# Patient Record
(demographics unavailable — no encounter records)

---

## 2024-10-08 NOTE — REASON FOR VISIT
[Follow-Up: _____] : a [unfilled] follow-up visit [FreeTextEntry1] : The patient is a postmenopausal female with a family history of breast cancer who was seen by Dr. Fernandez back in 2021 and underwent a right breast 12:00 stereotactic core biopsy for calcifications which showed fibroadenomatoid change and benign calcifications.  She was found to have increasing calcifications in the right breast upper outer quadrant as well as a slight mammographic density on mammography from September 2023 and stereotactic core biopsies were performed in October 2023 showed high-grade DCIS which was ER/ND positive.  MRI showed enhancement over region of 4 x 3.5 cm but the clips were felt to be close together towards the upper outer aspect of the right breast and she underwent a right breast partial mastectomy with bracketed localization with sentinel lymph node biopsy and tissue rearrangement reduction mastopexy by plastic surgery on January 8, 2024.  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  There was surrounding DCIS but formal margins were all free and she had no lymphovascular invasion.  She was found to have a 1 cm focus of DCIS in some of the extra breast tissue removed during the mastopexy on the right side but this did not extend any margins.  This was a pathologic prognostic stage IA breast cancer.  The left reduction mastopexy tissue just showed an atypical intraductal papilloma which was benign.  She was presented at tumor board and it was felt that we could just proceed with radiation without further surgery and she underwent external beam radiation with Dr. Collado which finished in March 2024.  She was seen by Dr. Staples and Oncotype recurrence score was 18 and anastrozole was recommended.  She comes in now for routine follow-up. [FreeTextEntry2] : January 8, 2024

## 2024-10-08 NOTE — ADDENDUM
[FreeTextEntry1] : I spent greater than 75% the consultation in face-to-face counseling and coordination of care in this patient with a history of a right breast cancer who underwent a partial mastectomy and comes in now for routine breast cancer screening/surveillance.

## 2024-10-08 NOTE — ASSESSMENT
[FreeTextEntry1] : The patient is a 59-year-old G3, P1 postmenopausal woman.  She underwent menarche at age 11.  She has a family history with her paternal aunt with breast cancer at age 50.  She was found to have some suspicious calcifications seen on mammography from [facility] radiology in [facility] on October 13, 2021 and was seen by Dr. Fernandez at that time and underwent right breast 12:00 stereotactic core biopsy on November 17, 2021 just showing fibroadenomatoid change and benign calcifications.  Follow-up mammographies in 2022 showed likely vascular or milk of calcium calcifications.  Another bilateral mammography performed on September 11, 2023 showed increasing calcifications again in the upper outer aspect of the right breast as well as a partially circumscribed mass in the upper outer aspect of the right breast and stereotactic core biopsies were performed.  The upper outer quadrant mass with calcifications showed intermediate to high-grade DCIS which was ER/MS positive (ER-95%, MS-50%) and she had an "hourglass" clip placed at that biopsy site and the upper outer quadrant calcifications showed intermediate grade DCIS again ER/MS positive (ER-95%, MS-90%) with a "stoplight" clip placed at that biopsy site.  She underwent [facility] genetic panel testing in November 2023 which was negative. MRI was performed on December 7, 2023 showing the area of the 2 clips with this localized upper outer quadrant region of non-mass like enhancement over an area of 4 x 3.5 x 3.5 cm. I did review her postbiopsy mammographic imaging as well as her recent MRI and these 2 clips were fairly close together and she was felt to be a good candidate for partial mastectomy with bracketed localizations and she was seen by Dr. Lerman preoperatively and felt to be a good candidate for reduction mastopexy at the time of surgery.  [place] lymph node biopsy was also recommended given the extent of the resection being performed.  She underwent a right breast partial mastectomy with bracketed localizations and sentinel lymph node biopsy with tissue rearrangement reduction mastopexy performed by Dr. Lerman on January 8, 2024.  The final pathology showed 2 negative right axillary sentinel lymph nodes and 1 negative right axillary nonsentinel lymph node and the wide excision did show a 1.3 cm high-grade micropapillary type invasive duct cancer but no lymphovascular invasion.  The invasive cancer extended to the superior medial margin however all final margins were negative.  She did have extensive DCIS measuring 3.5 cm which was high-grade with close margins on the initial partial mastectomy but again all final margins were negative.  The invasive cancer was ER positive greater than 95%, MS positive at 70%, HER2 2+ equivocal but negative by FISH with a Ki-67 of 30%.  The excess right breast tissue removed by plastic surgery did have 1 focal area with DCIS measuring 1 cm but margins were all negative.  This cancer was a pathologic prognostic stage IA T1c N0 M0 breast cancer. It was impossible to tell exactly where that separate area of DCIS originated from.  She did have an atypical intraductal papilloma seen in the excess breast tissue on the left.   The patient has been unhappy about her breast size which she thinks is too small but a significant amount of tissue had to be removed from her right breast upper outer quadrant in order to clear her margins. I did place fiducials at the site of the right breast upper outer quadrant partial mastectomy at the time of surgery for the radiation oncologist.  I did present her case in tumor board and there was agreement that we could just move forward with external beam radiation to the right breast and mastectomy was not required given the fact that this other incidental area of DCIS was focal and not extending to any of the margin of the excess tissue excised.  The invasive tumor had an Oncotype recurrence score of 18 and she was seen by Dr. Staples and was placed on anastrozole after radiation.  She was seen by Dr. Collado for radiation oncology evaluation and underwent hypofractionated radiation to the right breast which finished in March 2024.  The patient understands the benefits for endocrine therapy and was placed on anastrozole by Dr. Staples.  She underwent her last bilateral mammography and ultrasound which was reviewed from September 26, 2024 performed at [place] [facility] which showed bilateral postsurgical changes with a seroma measuring 2 cm seen in the left breast. On exam, the patient has had some fat necrosis felt on the lateral aspect of the left chest wall which was related to her reduction mastopexy and I did aspirate 6 cc of clear yellow fluid with near resolution of the mass today.  She has done well from her right breast radiation and does have some radiation changes to the skin but no evidence of recurrence.  Her free nipple grafts have healed well.  She was reassured and I would like to see her again in another 6 months for routine follow-up.  She should follow-up with Dr. Lerman from a cosmetic standpoint.  Her next routine bilateral mammography and ultrasound will be due in September 2025 and she was given prescriptions.  I would like to continue with yearly MRIs in this patient due to the fact that she had this other incidental finding of DCIS in some of the excess tissue removed by plastic surgery and I would like to stagger the MRIs at 6-month intervals with the mammo and ultrasound.  Her next MRI should be performed around March 2025 and she was given a prescription.  She should continue to follow-up with Dr. Staples and remain on anastrozole.  She is along a happy about the dogears on the sides of her chest wall and does not want go back to Dr. Lerman and I gave her Dr. Quijano and Dr. Goldberg's contact information.  She was also given Arthur Sheridan's name for nipple tattooing since she has had some depigmentation of the left nipple.

## 2024-10-08 NOTE — HISTORY OF PRESENT ILLNESS
[FreeTextEntry1] : The patient is a 59-year-old G3, P1 postmenopausal woman.  She underwent menarche at age 11.  She has a family history with her paternal aunt with breast cancer at age 50.  She was found to have some suspicious calcifications seen on mammography from [facility] radiology in [facility] on October 13, 2021 and was seen by Dr. Fernandez at that time and underwent right breast 12:00 stereotactic core biopsy on November 17, 2021 just showing fibroadenomatoid change and benign calcifications.  Follow-up mammographies in 2022 showed likely vascular or milk of calcium calcifications.  A more recent bilateral mammography performed on September 11, 2023 showed increasing calcifications again in the upper outer aspect of the right breast as well as a partially circumscribed mass in the upper outer aspect of the right breast and stereotactic core biopsies were performed.  The upper outer quadrant mass with calcifications showed intermediate to high-grade DCIS which was ER/CT positive and she had an "hourglass" clip placed at that biopsy site and the upper outer quadrant calcifications showed intermediate grade DCIS again ER/CT positive with a "stoplight" clip placed at that biopsy site.  She underwent [facility] genetic panel testing in November 2023 which was negative.  MRI was performed on December 7, 2023 showing the area of the 2 clips with this localized upper outer quadrant region of non-mass like enhancement over an area of 4 x 3.5 x 3.5 cm.  I did review her postbiopsy mammographic imaging as well as her recent MRI and these 2 clips were fairly close together and she was felt to be a good candidate for partial mastectomy with bracketed localizations and she was seen by Dr. Lerman preoperatively and felt to be a good candidate for reduction mastopexy at the time of surgery.  [place] lymph node biopsy was also recommended given the extent of the resection being performed.  She underwent a right breast partial mastectomy with bracketed localizations and sentinel lymph node biopsy with tissue rearrangement reduction mastopexy performed by Dr. Lerman on January 8, 2024.  She did have bilateral free nipple skin grafts performed.  The final pathology showed 2 negative right axillary sentinel lymph nodes and 1 negative right axillary nonsentinel lymph node and the wide excision did show a 1.3 cm high-grade micropapillary type invasive duct cancer but no lymphovascular invasion.  The invasive cancer extended to the superior medial margin however all final margins were negative.  She did have extensive DCIS measuring 3.5 cm which was high-grade with close margins on the initial partial mastectomy but again all final margins were negative.  The invasive cancer was ER positive greater than 95%, CT positive at 70%, HER2 2+ equivocal but negative by FISH with a Ki-67 of 30%.  The excess right breast tissue removed by plastic surgery did have 1 focal area with DCIS measuring 1 cm but margins were all negative.  This cancer was a pathologic prognostic stage IA T1 cN0 M0 breast cancer. It was impossible to tell exactly where that area of DCIS originated from.  She did have an atypical intraductal papilloma seen in the excess breast tissue on the left.  The tumor was sent for an Oncotype recurrence score which was 18 and she was seen by Dr. Staples who recommended anastrozole and no chemotherapy.  She was seen by Dr. Collado and underwent external beam radiation which finished in March 2024.  She comes in now for an interval follow up ????? lateral chest wall mass again ?????.

## 2024-10-08 NOTE — PHYSICAL EXAM
[No Supraclavicular Adenopathy] : no supraclavicular adenopathy [No Cervical Adenopathy] : no cervical adenopathy [Clear to Auscultation Bilat] : clear to auscultation bilaterally [Examined in the supine and seated position] : examined in the supine and seated position [Symmetrical] : symmetrical [No dominant masses] : no dominant masses in right breast  [No dominant masses] : no dominant masses left breast [No Nipple Retraction] : no left nipple retraction [No Nipple Discharge] : no left nipple discharge [Breast Mass Right Breast ___cm] : no masses [Breast Mass Left Breast ___cm] : no masses [No Axillary Lymphadenopathy] : no left axillary lymphadenopathy [No Edema] : no edema [No Rashes] : no rashes [Breast Nipple Inversion] : nipples not inverted [Breast Nipple Retraction] : nipples not retracted [Breast Nipple Flattening] : nipples not flattened [Breast Nipple Fissures] : nipples not fissured [Breast Abnormal Lactation (Galactorrhea)] : no galactorrhea [Breast Abnormal Secretion Bloody Fluid] : no bloody discharge [Breast Abnormal Secretion Serous Fluid] : no serous discharge [Breast Abnormal Secretion Opalescent Fluid] : no milky discharge [de-identified] : On exam, the patient has done well from her right breast partial mastectomy with bracketed localizations and sentinel lymph node biopsy with tissue rearrangement reduction mastopexy by plastics performed in January 2024.  She had free nipple skin grafts performed and then underwent external beam radiation which finished in March 2024.  She has no evidence of recurrence in the right breast and no suspicious findings in the left breast.  She has no axillary, supraclavicular, or cervical adenopathy. [de-identified] : Status post partial mastectomy with bracketed localization and sentinel lymph node biopsy with tissue rearrangement reduction mastopexy by plastics with free nipple skin graft and then radiation with no evidence of recurrence.   [de-identified] : Status post reduction mastopexy for symmetry with free nipple skin graft with a density felt on the far lateral aspect of the left breast consistent with fat necrosis and I performed a directed ultrasound and aspirated about ??????? 5 cc of clear yellow fluid with near resolution of the mass. [de-identified] : Free nipple skin grafts with some mild epidermolysis.

## 2024-10-08 NOTE — REASON FOR VISIT
[Follow-Up: _____] : a [unfilled] follow-up visit [FreeTextEntry1] : The patient is a postmenopausal female with a family history of breast cancer who was seen by Dr. Fernandez back in 2021 and underwent a right breast 12:00 stereotactic core biopsy for calcifications which showed fibroadenomatoid change and benign calcifications.  She was found to have increasing calcifications in the right breast upper outer quadrant as well as a slight mammographic density on mammography from September 2023 and stereotactic core biopsies were performed in October 2023 showed high-grade DCIS which was ER/CT positive.  MRI showed enhancement over region of 4 x 3.5 cm but the clips were felt to be close together towards the upper outer aspect of the right breast and she underwent a right breast partial mastectomy with bracketed localization with sentinel lymph node biopsy and tissue rearrangement reduction mastopexy by plastic surgery on January 8, 2024.  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  There was surrounding DCIS but formal margins were all free and she had no lymphovascular invasion.  She was found to have a 1 cm focus of DCIS in some of the extra breast tissue removed during the mastopexy on the right side but this did not extend any margins.  This was a pathologic prognostic stage IA breast cancer.  The left reduction mastopexy tissue just showed an atypical intraductal papilloma which was benign.  She was presented at tumor board and it was felt that we could just proceed with radiation without further surgery and she underwent external beam radiation with Dr. Collado which finished in March 2024.  She was seen by Dr. Staples and Oncotype recurrence score was 18 and anastrozole was recommended.  She comes in now for routine follow-up. [FreeTextEntry2] : January 8, 2024

## 2024-10-08 NOTE — PAST MEDICAL HISTORY
[Menarche Age ____] : age at menarche was [unfilled] [Approximately ___] : the LMP was approximately [unfilled] [Total Preg ___] : G[unfilled] [Live Births ___] : P[unfilled]  [Premature ___] : Premature: [unfilled] [History of Hormone Replacement Treatment] : has no history of hormone replacement treatment

## 2024-10-08 NOTE — ASSESSMENT
[FreeTextEntry1] : The patient is a 59-year-old G3, P1 postmenopausal woman.  She underwent menarche at age 11.  She has a family history with her paternal aunt with breast cancer at age 50.  She was found to have some suspicious calcifications seen on mammography from [facility] radiology in [facility] on October 13, 2021 and was seen by Dr. Fernandez at that time and underwent right breast 12:00 stereotactic core biopsy on November 17, 2021 just showing fibroadenomatoid change and benign calcifications.  Follow-up mammographies in 2022 showed likely vascular or milk of calcium calcifications.  Another bilateral mammography performed on September 11, 2023 showed increasing calcifications again in the upper outer aspect of the right breast as well as a partially circumscribed mass in the upper outer aspect of the right breast and stereotactic core biopsies were performed.  The upper outer quadrant mass with calcifications showed intermediate to high-grade DCIS which was ER/MI positive (ER-95%, MI-50%) and she had an "hourglass" clip placed at that biopsy site and the upper outer quadrant calcifications showed intermediate grade DCIS again ER/MI positive (ER-95%, MI-90%) with a "stoplight" clip placed at that biopsy site.  She underwent [facility] genetic panel testing in November 2023 which was negative. MRI was performed on December 7, 2023 showing the area of the 2 clips with this localized upper outer quadrant region of non-mass like enhancement over an area of 4 x 3.5 x 3.5 cm. I did review her postbiopsy mammographic imaging as well as her recent MRI and these 2 clips were fairly close together and she was felt to be a good candidate for partial mastectomy with bracketed localizations and she was seen by Dr. Lerman preoperatively and felt to be a good candidate for reduction mastopexy at the time of surgery.  [place] lymph node biopsy was also recommended given the extent of the resection being performed.  She underwent a right breast partial mastectomy with bracketed localizations and sentinel lymph node biopsy with tissue rearrangement reduction mastopexy performed by Dr. Lerman on January 8, 2024.  The final pathology showed 2 negative right axillary sentinel lymph nodes and 1 negative right axillary nonsentinel lymph node and the wide excision did show a 1.3 cm high-grade micropapillary type invasive duct cancer but no lymphovascular invasion.  The invasive cancer extended to the superior medial margin however all final margins were negative.  She did have extensive DCIS measuring 3.5 cm which was high-grade with close margins on the initial partial mastectomy but again all final margins were negative.  The invasive cancer was ER positive greater than 95%, MI positive at 70%, HER2 2+ equivocal but negative by FISH with a Ki-67 of 30%.  The excess right breast tissue removed by plastic surgery did have 1 focal area with DCIS measuring 1 cm but margins were all negative.  This cancer was a pathologic prognostic stage IA T1c N0 M0 breast cancer. It was impossible to tell exactly where that separate area of DCIS originated from.  She did have an atypical intraductal papilloma seen in the excess breast tissue on the left.   The patient has been unhappy about her breast size which she thinks is too small but a significant amount of tissue had to be removed from her right breast upper outer quadrant in order to clear her margins. I did place fiducials at the site of the right breast upper outer quadrant partial mastectomy at the time of surgery for the radiation oncologist.  I did present her case in tumor board and there was agreement that we could just move forward with external beam radiation to the right breast and mastectomy was not required given the fact that this other incidental area of DCIS was focal and not extending to any of the margin of the excess tissue excised.  The invasive tumor had an Oncotype recurrence score of 18 and she was seen by Dr. Staples and was placed on anastrozole after radiation.  She was seen by Dr. Collado for radiation oncology evaluation and underwent hypofractionated radiation to the right breast which finished in March 2024.  The patient understands the benefits for endocrine therapy and was placed on anastrozole by Dr. Staples.  She underwent her last bilateral mammography and ultrasound which was reviewed from September 26, 2024 performed at [place] [facility] which showed bilateral postsurgical changes with a seroma measuring 2 cm seen in the left breast. On exam, the patient has had some fat necrosis felt on the lateral aspect of the left chest wall which was related to her reduction mastopexy and I did aspirate 6 cc of clear yellow fluid with near resolution of the mass today.  She has done well from her right breast radiation and does have some radiation changes to the skin but no evidence of recurrence.  Her free nipple grafts have healed well.  She was reassured and I would like to see her again in another 6 months for routine follow-up.  She should follow-up with Dr. Lerman from a cosmetic standpoint.  Her next routine bilateral mammography and ultrasound will be due in September 2025 and she was given prescriptions.  I would like to continue with yearly MRIs in this patient due to the fact that she had this other incidental finding of DCIS in some of the excess tissue removed by plastic surgery and I would like to stagger the MRIs at 6-month intervals with the mammo and ultrasound.  Her next MRI should be performed around March 2025 and she was given a prescription.  She should continue to follow-up with Dr. Staples and remain on anastrozole.  She is along a happy about the dogears on the sides of her chest wall and does not want go back to Dr. Lerman and I gave her Dr. Quijano and Dr. Goldberg's contact information.  She was also given Arthur Sheridan's name for nipple tattooing since she has had some depigmentation of the left nipple.

## 2024-10-08 NOTE — PHYSICAL EXAM
[No Supraclavicular Adenopathy] : no supraclavicular adenopathy [No Cervical Adenopathy] : no cervical adenopathy [Clear to Auscultation Bilat] : clear to auscultation bilaterally [Examined in the supine and seated position] : examined in the supine and seated position [Symmetrical] : symmetrical [No dominant masses] : no dominant masses in right breast  [No dominant masses] : no dominant masses left breast [No Nipple Retraction] : no left nipple retraction [No Nipple Discharge] : no left nipple discharge [Breast Mass Right Breast ___cm] : no masses [Breast Mass Left Breast ___cm] : no masses [No Axillary Lymphadenopathy] : no left axillary lymphadenopathy [No Edema] : no edema [No Rashes] : no rashes [Breast Nipple Inversion] : nipples not inverted [Breast Nipple Retraction] : nipples not retracted [Breast Nipple Flattening] : nipples not flattened [Breast Nipple Fissures] : nipples not fissured [Breast Abnormal Lactation (Galactorrhea)] : no galactorrhea [Breast Abnormal Secretion Bloody Fluid] : no bloody discharge [Breast Abnormal Secretion Serous Fluid] : no serous discharge [Breast Abnormal Secretion Opalescent Fluid] : no milky discharge [de-identified] : On exam, the patient has done well from her right breast partial mastectomy with bracketed localizations and sentinel lymph node biopsy with tissue rearrangement reduction mastopexy by plastics performed in January 2024.  She had free nipple skin grafts performed and then underwent external beam radiation which finished in March 2024.  She has no evidence of recurrence in the right breast and no suspicious findings in the left breast.  She has no axillary, supraclavicular, or cervical adenopathy. [de-identified] : Status post partial mastectomy with bracketed localization and sentinel lymph node biopsy with tissue rearrangement reduction mastopexy by plastics with free nipple skin graft and then radiation with no evidence of recurrence.   [de-identified] : Status post reduction mastopexy for symmetry with free nipple skin graft with a density felt on the far lateral aspect of the left breast consistent with fat necrosis and I performed a directed ultrasound and aspirated about ??????? 5 cc of clear yellow fluid with near resolution of the mass. [de-identified] : Free nipple skin grafts with some mild epidermolysis.

## 2025-01-09 NOTE — RESULTS/DATA
[TextEntry] : MRI and CT reviewed on disk.  To be archived.  Lobulated 13.8 cm rt pelvic mass. proteinaceous paraovarian cyst versus peritoneal inclusion cyst. Possible SM fibroid vs polyp

## 2025-01-09 NOTE — PHYSICAL EXAM
[73783] : A chaperone was present during the pelvic exam. [FreeTextEntry7] : Umb hernia, reducible. Unable to palpate mass secondary to habitus. [Labia Majora] : normal [Normal] : normal [FreeTextEntry6] : Unable to palpate fundus secondary to habitus. Unable to palpate adnexal mass.

## 2025-01-09 NOTE — DISCUSSION/SUMMARY
[FreeTextEntry1] : 60 yo P1 w/abd/pelv mass of unclear etiology. Imaging favors benign process. Pt w/h/o ER/IL+ breast ca. Imaging also shows possible SM myoma vs polyp. Pt had episode of PMB 1-2 years ago. Also w/umb hernia - interested in repair. Past hx of breast ca, htn, DM, elevated BMI, hyperlipidemia,   Plan Draw tumor markers Refer to G. Surg for hernia Plan for [facility] BSO dx hsc, possible hsc polypectomy TEB x 3 wks for f/u and to schedule surgery.  Letter to Dr. Tessy Garcia

## 2025-01-21 NOTE — HISTORY OF PRESENT ILLNESS
[de-identified] : 59 year old woman with pelvic mass, seen by Dr. Capellan; presents to the office today to discuss her umbilical hernia and discuss possible combined surgery.  No pain at the hernia site; no nausea, vomiting, fever or chills. Tolerating PO intake, having normal GI function. Ambulating without issues.

## 2025-01-21 NOTE — PLAN
[FreeTextEntry1] : 59 year old woman with anterior abdominal wall hernia - signs/symptoms of incarceration, strangulation, and obstruction discussed with the patient. They are aware that if they develop to above signs/symptoms to go to the ED immediately.  - risks, benefits, and alternatives to anterior abdominal wall hernia repair with possible mesh discussed with patient at length. Risks of hernia recurrence; risks of mesh discussed with patient. Logistics of combined surgery discussed with the patient. All questions answered.  - OR planning

## 2025-01-21 NOTE — PHYSICAL EXAM
[No Rash or Lesion] : No rash or lesion [Alert] : alert [Oriented to Person] : oriented to person [Oriented to Place] : oriented to place [Calm] : calm [de-identified] : comfortable, well appearing [de-identified] : breathing comfortably on room air; no cough [de-identified] : no scleral icterus [de-identified] : soft, not tender and not distended reducible umbilical hernia; without skin changes

## 2025-01-29 NOTE — VITALS
Maximal Pain Intensity: 0/10] : 0/10 [Least Pain Intensity: 0/10] : 0/10 [90: Able to carry normal activity; minor signs or symptoms of disease.] : 90: Able to carry normal activity; minor signs or symptoms of disease.  [Date: ____________] : Patient's last distress assessment performed on [unfilled]. [3 - Distress Level] : Distress Level: 3

## 2025-02-04 NOTE — PHYSICAL EXAM
[Normal] : oriented to person, place and time, the affect was normal, the mood was normal and not anxious [de-identified] : s/p right partial mastectomy and SLN, s/p bilateral mastoplexy, mild persistent hyperpigmentation, good cosmetic outcome

## 2025-02-04 NOTE — DISEASE MANAGEMENT
[Clinical] : TNM Stage: c [IA] : IA [FreeTextEntry4] : Invasive Ductal Carcinoma of the Right Breast, ER/NE+ [TTNM] : 1c [NTNM] : 0 [MTNM] : X

## 2025-02-04 NOTE — DISEASE MANAGEMENT
[Clinical] : TNM Stage: c [IA] : IA [FreeTextEntry4] : Invasive Ductal Carcinoma of the Right Breast, ER/TX+ [TTNM] : 1c [NTNM] : 0 [MTNM] : X

## 2025-02-04 NOTE — HISTORY OF PRESENT ILLNESS
[FreeTextEntry1] : Mrs. Grullon is a 59-year-old female, diagnosed with Stage I ER/WY+ invasive ductal carcinoma of the right breast status post right breast partial mastectomy with sentinel lymph node biopsy and tissue rearrangement reduction mastopexy, status post adjuvant radiation therapy to the right breast.  She is present today for her routine follow-up.   On 9/11/2023, Ms. Grullon underwent a mammogram that revealed regional microcalcifications at the posterior upper outer aspect of the right breast appears slightly increased in number.  Partially circumscribed mass at the posterior upper outer aspect of the right breast with increased associated microcalcifications was also demonstrated.  There was no mammographic evidence of malignancy on the left.  10/24/2023 a stereotactic core biopsy of the right breast upper outer quadrant mass was performed pathology revealed:  ductal carcinoma in situ, nuclear grade II-III, papillary and cribriform types associated with calcifications,  ER/WY +.  Ms. Grullon was evaluated by genetic counselor and genetic testing was negative on 11/9/23.  12/7/2023 MRI bilateral breasts demonstrated in the right breast upper outer quadrant, middle third, a focal noncontiguous clumped nonmass enhancement in association with 2 biopsy markers.  It measured approximately 4cm AP x 3.5cm TV by 3.5cm  On 1/8/2024, Ms. Grullon underwent a right breast partial mastectomy with bracketed localization and sentinel lymph node biopsy with bilateral tissue rearrangement reduction mastopexy performed by Dr. Kamara and Dr. Umanzor. Pathology revealed: a 13mm single focus of invasive micropapillary ductal carcinoma, ER/WY+ . DCIS was present and measured 1cm, nuclear grade II, microcalcifications were present in DCIS. 0/3 sentinel lymph nodes were positive. Margins were formally negative prior to mastopexy.  Ms. Grullon tolerated the procedure well without significant side effects.  Oncotype recurrence score was 18.  Ms. Grullon is status post 15 fractions of radiation to the right breast to a total of 4005cGy completed 3/25/24. She tolerated treatment well with expected acute side effects and mild skin reaction.   1/30/25 - Routine follow-up  Ms. Grullon reports occasional breast discomfort, hot flashes from hormonal therapy. She has follow-up scheduled with Dr. Staples on 2/18/25. She continues taking anastrozole and does not note significant side effects. Bilateral mammography and ultrasound were performed in September 2024 and demonstrated no mammographic or sonographic evidence of malignancy. Ms. Grullon will continue MRIs at 6-month intervals - next one is due March 2025.  She has follow-up scheduled with Dr. Kamara April 2025.  Of note, Ms. Grullon has been evaluated by her gynecologist and she was found to have a 13.8 cm right pelvic lobulated mass that may be a proteinaceous para-ovarian cyst vs. a peritoneal inclusion cyst. Ms. Grullon is anxious about the potential diagnosis. She will undergo further evaluation with a gynecologist oncologist.

## 2025-02-04 NOTE — HISTORY OF PRESENT ILLNESS
[FreeTextEntry1] : Mrs. Grullon is a 59-year-old female, diagnosed with Stage I ER/AL+ invasive ductal carcinoma of the right breast status post right breast partial mastectomy with sentinel lymph node biopsy and tissue rearrangement reduction mastopexy, status post adjuvant radiation therapy to the right breast.  She is present today for her routine follow-up.   On 9/11/2023, Ms. Grullon underwent a mammogram that revealed regional microcalcifications at the posterior upper outer aspect of the right breast appears slightly increased in number.  Partially circumscribed mass at the posterior upper outer aspect of the right breast with increased associated microcalcifications was also demonstrated.  There was no mammographic evidence of malignancy on the left.  10/24/2023 a stereotactic core biopsy of the right breast upper outer quadrant mass was performed pathology revealed:  ductal carcinoma in situ, nuclear grade II-III, papillary and cribriform types associated with calcifications,  ER/AL +.  Ms. Grullon was evaluated by genetic counselor and genetic testing was negative on 11/9/23.  12/7/2023 MRI bilateral breasts demonstrated in the right breast upper outer quadrant, middle third, a focal noncontiguous clumped nonmass enhancement in association with 2 biopsy markers.  It measured approximately 4cm AP x 3.5cm TV by 3.5cm  On 1/8/2024, Ms. Grullon underwent a right breast partial mastectomy with bracketed localization and sentinel lymph node biopsy with bilateral tissue rearrangement reduction mastopexy performed by Dr. Kamara and Dr. Umanzor. Pathology revealed: a 13mm single focus of invasive micropapillary ductal carcinoma, ER/AL+ . DCIS was present and measured 1cm, nuclear grade II, microcalcifications were present in DCIS. 0/3 sentinel lymph nodes were positive. Margins were formally negative prior to mastopexy.  Ms. Grullon tolerated the procedure well without significant side effects.  Oncotype recurrence score was 18.  Ms. Grullon is status post 15 fractions of radiation to the right breast to a total of 4005cGy completed 3/25/24. She tolerated treatment well with expected acute side effects and mild skin reaction.   1/30/25 - Routine follow-up  Ms. Grullon reports occasional breast discomfort, hot flashes from hormonal therapy. She has follow-up scheduled with Dr. Staples on 2/18/25. She continues taking anastrozole and does not note significant side effects. Bilateral mammography and ultrasound were performed in September 2024 and demonstrated no mammographic or sonographic evidence of malignancy. Ms. Grullon will continue MRIs at 6-month intervals - next one is due March 2025.  She has follow-up scheduled with Dr. Kamara April 2025.  Of note, Ms. Grullon has been evaluated by her gynecologist and she was found to have a 13.8 cm right pelvic lobulated mass that may be a proteinaceous para-ovarian cyst vs. a peritoneal inclusion cyst. Ms. Grullon is anxious about the potential diagnosis. She will undergo further evaluation with a gynecologist oncologist.

## 2025-02-04 NOTE — HISTORY OF PRESENT ILLNESS
[FreeTextEntry1] : Mrs. Grullon is a 59-year-old female, diagnosed with Stage I ER/UT+ invasive ductal carcinoma of the right breast status post right breast partial mastectomy with sentinel lymph node biopsy and tissue rearrangement reduction mastopexy, status post adjuvant radiation therapy to the right breast.  She is present today for her routine follow-up.   On 9/11/2023, Ms. Grullon underwent a mammogram that revealed regional microcalcifications at the posterior upper outer aspect of the right breast appears slightly increased in number.  Partially circumscribed mass at the posterior upper outer aspect of the right breast with increased associated microcalcifications was also demonstrated.  There was no mammographic evidence of malignancy on the left.  10/24/2023 a stereotactic core biopsy of the right breast upper outer quadrant mass was performed pathology revealed:  ductal carcinoma in situ, nuclear grade II-III, papillary and cribriform types associated with calcifications,  ER/UT +.  Ms. Grullon was evaluated by genetic counselor and genetic testing was negative on 11/9/23.  12/7/2023 MRI bilateral breasts demonstrated in the right breast upper outer quadrant, middle third, a focal noncontiguous clumped nonmass enhancement in association with 2 biopsy markers.  It measured approximately 4cm AP x 3.5cm TV by 3.5cm  On 1/8/2024, Ms. Grullon underwent a right breast partial mastectomy with bracketed localization and sentinel lymph node biopsy with bilateral tissue rearrangement reduction mastopexy performed by Dr. Kamara and Dr. Umanzor. Pathology revealed: a 13mm single focus of invasive micropapillary ductal carcinoma, ER/UT+ . DCIS was present and measured 1cm, nuclear grade II, microcalcifications were present in DCIS. 0/3 sentinel lymph nodes were positive. Margins were formally negative prior to mastopexy.  Ms. Grullon tolerated the procedure well without significant side effects.  Oncotype recurrence score was 18.  Ms. Grullon is status post 15 fractions of radiation to the right breast to a total of 4005cGy completed 3/25/24. She tolerated treatment well with expected acute side effects and mild skin reaction.   1/30/25 - Routine follow-up  Ms. Grullon reports occasional breast discomfort, hot flashes from hormonal therapy. She has follow-up scheduled with Dr. Staples on 2/18/25. She continues taking anastrozole and does not note significant side effects. Bilateral mammography and ultrasound were performed in September 2024 and demonstrated no mammographic or sonographic evidence of malignancy. Ms. Grullon will continue MRIs at 6-month intervals - next one is due March 2025.  She has follow-up scheduled with Dr. Kamara April 2025.  Of note, Ms. Grullon has been evaluated by her gynecologist and she was found to have a 13.8 cm right pelvic lobulated mass that may be a proteinaceous para-ovarian cyst vs. a peritoneal inclusion cyst. Ms. Grullon is anxious about the potential diagnosis. She will undergo further evaluation with a gynecologist oncologist.

## 2025-02-04 NOTE — PHYSICAL EXAM
[Normal] : oriented to person, place and time, the affect was normal, the mood was normal and not anxious [de-identified] : s/p right partial mastectomy and SLN, s/p bilateral mastoplexy, mild persistent hyperpigmentation, good cosmetic outcome

## 2025-02-04 NOTE — REVIEW OF SYSTEMS
[Fatigue: Grade 0] : Fatigue: Grade 0 [Breast Pain: Grade 1 - Mild pain] : Breast Pain: Grade 1 - Mild pain [Skin Hyperpigmentation: Grade 0] : Skin Hyperpigmentation: Grade 0 [Dermatitis Radiation: Grade 0] : Dermatitis Radiation: Grade 0 [FreeTextEntry7] : Occasional

## 2025-02-04 NOTE — PHYSICAL EXAM
[Normal] : oriented to person, place and time, the affect was normal, the mood was normal and not anxious [de-identified] : s/p right partial mastectomy and SLN, s/p bilateral mastoplexy, mild persistent hyperpigmentation, good cosmetic outcome

## 2025-02-04 NOTE — DISEASE MANAGEMENT
[Clinical] : TNM Stage: c [IA] : IA [FreeTextEntry4] : Invasive Ductal Carcinoma of the Right Breast, ER/NH+ [TTNM] : 1c [NTNM] : 0 [MTNM] : X

## 2025-02-06 NOTE — OB HISTORY
[Total Preg ___] : : [unfilled] [Premature ___] : [unfilled] (premature) [Living ___] : [unfilled] (living) [Vaginal ___] : [unfilled] vaginal delivery(s) [Definite ___ (Date)] : the last menstrual period was [unfilled] [Menarche Age ____] : age at menarche was [unfilled] [Menopause  Age ____] : menopause occurred at age [unfilled] [Abortions ___] : [unfilled] (abortions)

## 2025-02-06 NOTE — ASSESSMENT
[FreeTextEntry1] : 59 y.o. woman with a history of node negative hormone receptor positive breast cancer currently on anastrazole and recently diagnosed with a large cystic pelvic mass anterior to the uterus. The mass is about 15 cm and fluid filled. There is no associated adenopathy, ascites or other masses. She has fibroids and a history of myomectomy. With the uterus pulled up into the pelvis I suspect she has significant scarring from her prior surgery. Given her history and the size of the mass I would recommend removal. In fact, given her history of receptor positive breast cancer I would at least recommend a BSO. She is already having hot flushes that affect her quality but I think that the BSO is still warranted given the size of the mass. If the mass is benign she does not need any further surgery as she "wants to keep all her parts". However, if the mass is malignant I would recommend hysterectomy omentectomy and lymph node sampling. Her  elevation catches our attention but the mass still looks pretty smooth. It could be due to her fibroids but she clearly still needs surgical evaluation. I would be willing to do a laparascopic attempt although we will have to put it in a bag to safely remove it. She also may need a laparotomy not only to remove the specimen but possiblly to perform her surgery depending on our intraoperative findings. Either way, she would also like to repair her umbilical hernia at the same time which would be easier and safer if I don't do a hysterectomy and enter a contaminated space. All her questions werer answered. She expressed understanding and agrees to the above plans. We will try to coordinate with Dr Lyle Brantley from [facility] surgery to fix her hernia. I asked her not to use depilatory cream prior to her surgery as it would increase her risk of infection. I also would like her to see anesthesia as she is on Mounjaro and is morbidly obese.

## 2025-02-06 NOTE — HISTORY OF PRESENT ILLNESS
[FreeTextEntry1] : Referred by Dr. JESSEE Capellan PCP: Dr. Anna Stinson Gyn: Dr. Tessy Garcia Rad/Onc: Dr. Patti Collado Breast Surg: Dr. Anselmo Kamara Heme/Onc: Dr. Renee Staples Gen Surg: Dr. Lyle Brantley   Ms. VILLALPANDO is a 59 year old  female LMP , seen at the request of Dr. Capellan for further evaluation and management of a pelvic mass with elevated tumor markers. She originally presented to GYN Dr. Tessy Garcia in October with c/o abdominal fullness and bloating. She also reports that she sometimes feels the sensation of incomplete void. A palpable mass was noted on exam. A CT A/P was performed and revealed a 15 cm mass in the anterior pelvis. An MRI of the pelvis was also ordered for further evaluation. Findings included below. She denies bleeding or discharge. She denies nausea/vomiting, or other changes in bowel or urinary habits. She had PMB a few months ago and had an EMB that was negative but I don't have those results.   Of note, Ms. VILLALPANDO has a personal history of stage I ER/WI+ invasive ductal carcinoma of the right breast, diagnosed in 10/2023. She is status post right breast partial mastectomy with sentinel lymph node biopsy and tissue rearrangement reduction mastopexy in 2024. She is now status post 15 fractions of radiation to the right breast to a total of 4005cGy completed 3/25/24. She tolerated treatment well with expected acute side effects and mild skin reaction. Bilateral mammography and ultrasound in 2024 that demonstrated no mammographic or sonographic evidence of malignancy and MRIs at 6-month intervals - next due 2025. She is currently MISTY and is on anastrozole (started in 2024, will continue for 5 years)  Of note, she was also referred to gen surg Dr. Lyle Brantley for discussion of umbilical hernia repair at the time of her pelvic surgery, when the time comes.   She is currently not working; She is  and lives with her daughter in [place].   [employer] Analyses 2023 by Dr. Ran Story - No mutations detected (Reports in chart)  2025 TM  = 55 CEA = 4.7 Ca 19-9 = <2   10/21/2024 MRI Pelvis - Lobulated T1 hyperintense structure measuring up to 13.8 cm extending from the right lower quadrant into the pelvis with differential considerations including right proteinaceous paraovarian cyst versus peritoneal inclusion cyst - Uterine fibroids measuring up to 2.1 cm including a subcentimeter submucosal/intracavitary fibroid - 0.6 cm heterogeneous structure within the fundal endometrial canal possibly representing additional submucosal fibroid versus polyp  10/4/2024 CT A/P 15.1 cm lobulated low-density mass in the anterior pelvis that likely corresponds to the palpable abnormality. This abuts and appears inseparable from the right ovary. Differential considerations include complex paraovarian cyst, complex peritoneal inclusion cyst, and solid mass such as desmoid tumor.   PMHx- Diabetes, obesity, breast cancer, HTN, HLD PSHx- Ovarian cystectomy and myomectomy, right breast partial mastectomy with bracketed localization and sentinel lymph node biopsy with tissue rearrangement reduction mastopexy (2024, [facility]) Family hx of cancer- Maternal cousin with colon cancer at age 65. Maternal cousin with breast cancer at age 60. Maternal aunt with multiple myeloma at age 70.   HM Pap- 2024, Negative Mammo- 2024 Colonoscopy- 2024

## 2025-02-06 NOTE — ASSESSMENT
[FreeTextEntry1] : 59 y.o. woman with a history of node negative hormone receptor positive breast cancer currently on anastrazole and recently diagnosed with a large cystic pelvic mass anterior to the uterus. The mass is about 15 cm and fluid filled. There is no associated adenopathy, ascites or other masses. She has fibroids and a history of myomectomy. With the uterus pulled up into the pelvis I suspect she has significant scarring from her prior surgery. Given her history and the size of the mass I would recommend removal. In fact, given her history of receptor positive breast cancer I would at least recommend a BSO. She is already having hot flushes that affect her quality but I think that the BSO is still warranted given the size of the mass. If the mass is benign she does not need any further surgery as she "wants to keep all her parts". However, if the mass is malignant I would recommend hysterectomy omentectomy and lymph node sampling. Her  elevation catches our attention but the mass still looks pretty smooth. It could be due to her fibroids but she clearly still needs surgical evaluation. I would be willing to do a laparascopic attempt although we will have to put it in a bag to safely remove it. She also may need a laparotomy not only to remove the specimen but possiblly to perform her surgery depending on our intraoperative findings. Either way, she would also like to repair her umbilical hernia at the same time which would be easier and safer if I don't do a hysterectomy and enter a contaminated space. All her questions werer answered. She expressed understanding and agrees to the above plans. We will try to coordinate with Dr Lyle Brantley from [facility] surgery to fix her hernia. I asked her not to use depilatory cream prior to her surgery as it would increase her risk of infection. I also would like her to see anesthesia as she is on Mounjaro and is morbidly obese.

## 2025-02-06 NOTE — REVIEW OF SYSTEMS
[Negative] : Musculoskeletal [Diabetes] : diabetes mellitus [FreeTextEntry1] : recent dermatitis under her panus from depilatory cream [FreeTextEntry4] : Sensation of incomplete void [FreeTextEntry7] : weight loss on Mounjaro and a variety of other GLP-1 meds. [de-identified] : Abdominal distention

## 2025-02-06 NOTE — REVIEW OF SYSTEMS
[Negative] : Musculoskeletal [Diabetes] : diabetes mellitus [FreeTextEntry1] : recent dermatitis under her panus from depilatory cream [FreeTextEntry4] : Sensation of incomplete void [FreeTextEntry7] : weight loss on Mounjaro and a variety of other GLP-1 meds. [de-identified] : Abdominal distention

## 2025-02-06 NOTE — PHYSICAL EXAM
[Chaperone Present] : A chaperone was present in the examining room during all aspects of the physical examination [[id number]] : A chaperone was present during the pelvic exam. [FreeTextEntry2] : Ernestina [Absent] : CVAT: absent [Abnormal] : Bimanual Exam: Abnormal [Normal] : Recto-Vaginal Exam: Normal [FreeTextEntry1] : morbidly obese and not happy with her diagnosis [de-identified] : large panus with macerated skin under the fold. No cellulitis. umbilical hernia not tender but difficult to reduce. no ascites fullness in the upper pelvis [de-identified] : 15 cm mass under the umbilicus [de-identified] : umbilical hernia [de-identified] : macerated skin under panus [de-identified] : she appears a little angry/annoyed [de-identified] : pulled up into the pelvis. O/W normal in appearance [de-identified] : pulled up and difficult to feel. I think it is slightly enlarged [de-identified] : central pelvic mass anterior and above uterus. smooth, NT. [de-identified] : utrus pulled up and mass palpable above it.  [Fully active, able to carry on all pre-disease performance without restriction] : Status 0 - Fully active, able to carry on all pre-disease performance without restriction

## 2025-02-06 NOTE — ASSESSMENT
[FreeTextEntry1] : 59 y.o. woman with a history of node negative hormone receptor positive breast cancer currently on anastrazole and recently diagnosed with a large cystic pelvic mass anterior to the uterus. The mass is about 15 cm and fluid filled. There is no associated adenopathy, ascites or other masses. She has fibroids and a history of myomectomy. With the uterus pulled up into the pelvis I suspect she has significant scarring from her prior surgery. Given her history and the size of the mass I would recommend removal. In fact, given her history of receptor positive breast cancer I would at least recommend a BSO. She is already having hot flushes that affect her quality but I think that the BSO is still warranted given the size of the mass. If the mass is benign she does not need any further surgery as she "wants to keep all her parts". However, if the mass is malignant I would recommend hysterectomy omentectomy and lymph node sampling. Her  elevation catches our attention but the mass still looks pretty smooth. It could be due to her fibroids but she clearly still needs surgical evaluation. I would be willing to do a laparascopic attempt although we will have to put it in a bag to safely remove it. She also may need a laparotomy not only to remove the specimen but possiblly to perform her surgery depending on our intraoperative findings. Either way, she would also like to repair her umbilical hernia at the same time which would be easier and safer if I don't do a hysterectomy and enter a contaminated space. All her questions werer answered. She expressed understanding and agrees to the above plans. We will try to coordinate with Dr Lyle Brantley from [facility] surgery to fix her hernia. I asked her not to use depilatory cream prior to her surgery as it would increase her risk of infection. I also would like her to see anesthesia as she is on Mounjaro and is morbidly obese.

## 2025-02-06 NOTE — HISTORY OF PRESENT ILLNESS
[FreeTextEntry1] : Referred by Dr. JESSEE Capellan PCP: Dr. Anna Stinson Gyn: Dr. Tessy Garcia Rad/Onc: Dr. Patti Collado Breast Surg: Dr. Anselmo Kamara Heme/Onc: Dr. Renee Staples Gen Surg: Dr. Lyle Brantley   Ms. VILLALPANDO is a 59 year old  female LMP , seen at the request of Dr. Capellan for further evaluation and management of a pelvic mass with elevated tumor markers. She originally presented to GYN Dr. Tessy Garcia in October with c/o abdominal fullness and bloating. She also reports that she sometimes feels the sensation of incomplete void. A palpable mass was noted on exam. A CT A/P was performed and revealed a 15 cm mass in the anterior pelvis. An MRI of the pelvis was also ordered for further evaluation. Findings included below. She denies bleeding or discharge. She denies nausea/vomiting, or other changes in bowel or urinary habits. She had PMB a few months ago and had an EMB that was negative but I don't have those results.   Of note, Ms. VILLALPANDO has a personal history of stage I ER/MA+ invasive ductal carcinoma of the right breast, diagnosed in 10/2023. She is status post right breast partial mastectomy with sentinel lymph node biopsy and tissue rearrangement reduction mastopexy in 2024. She is now status post 15 fractions of radiation to the right breast to a total of 4005cGy completed 3/25/24. She tolerated treatment well with expected acute side effects and mild skin reaction. Bilateral mammography and ultrasound in 2024 that demonstrated no mammographic or sonographic evidence of malignancy and MRIs at 6-month intervals - next due 2025. She is currently MISTY and is on anastrozole (started in 2024, will continue for 5 years)  Of note, she was also referred to gen surg Dr. Lyle Brantley for discussion of umbilical hernia repair at the time of her pelvic surgery, when the time comes.   She is currently not working; She is  and lives with her daughter in [place].   [facility] [employer] Analyses 2023 by Dr. Ran Story - No mutations detected (Reports in chart)  2025 TM  = 55 CEA = 4.7 Ca 19-9 = <2   10/21/2024 MRI Pelvis - Lobulated T1 hyperintense structure measuring up to 13.8 cm extending from the right lower quadrant into the pelvis with differential considerations including right proteinaceous paraovarian cyst versus peritoneal inclusion cyst - Uterine fibroids measuring up to 2.1 cm including a subcentimeter submucosal/intracavitary fibroid - 0.6 cm heterogeneous structure within the fundal endometrial canal possibly representing additional submucosal fibroid versus polyp  10/4/2024 CT A/P 15.1 cm lobulated low-density mass in the anterior pelvis that likely corresponds to the palpable abnormality. This abuts and appears inseparable from the right ovary. Differential considerations include complex paraovarian cyst, complex peritoneal inclusion cyst, and solid mass such as desmoid tumor.   PMHx- Diabetes, obesity, breast cancer, HTN, HLD PSHx- Ovarian cystectomy and myomectomy, right breast partial mastectomy with bracketed localization and sentinel lymph node biopsy with tissue rearrangement reduction mastopexy (2024, [facility]) Family hx of cancer- Maternal cousin with colon cancer at age 65. Maternal cousin with breast cancer at age 60. Maternal aunt with multiple myeloma at age 70.   HM Pap- 2024, Negative Mammo- 2024 Colonoscopy- 2024

## 2025-02-06 NOTE — PAST MEDICAL HISTORY
[Postmenopausal] : The patient is postmenopausal [Definite ___ (Date)] : the last menstrual period was [unfilled] [Total Preg ___] : G[unfilled] [Live Births ___] : P[unfilled]  [Premature ___] : Premature: [unfilled] [Living ___] : Living: [unfilled] [Menarche Age ____] : age at menarche was [unfilled] [Menopause Age____] : age at menopause was [unfilled] [Abortions ___] : Abortions:[unfilled] [AB Induced ___] : elective abortions: [unfilled]  [AB Spont ___] : miscarriages: [unfilled]  [FreeTextEntry5] : Ovarian cystectomy 1994

## 2025-02-06 NOTE — LETTER BODY
[Dear  ___] : Dear  [unfilled], [I had the pleasure of evaluating your patient, [unfilled] for ___] : I had the pleasure of evaluating your patient, [unfilled] for [unfilled]. [FreeTextEntry2] : As you will recall, she is a 59 y.o. with a history of node negative, hormone receptor positive breast cancer on anastrazole. She developed a large, 15 cm cyctic pelvic mass and I have discussed surgical evaluation with at least a BSO. I will coordinate with Dr Lyle Brantley from gen surg to fix her ubilical hernia at the same time.

## 2025-02-06 NOTE — REVIEW OF SYSTEMS
[Negative] : Musculoskeletal [Diabetes] : diabetes mellitus [FreeTextEntry1] : recent dermatitis under her panus from depilatory cream [FreeTextEntry4] : Sensation of incomplete void [FreeTextEntry7] : weight loss on Mounjaro and a variety of other GLP-1 meds. [de-identified] : Abdominal distention

## 2025-02-06 NOTE — HISTORY OF PRESENT ILLNESS
[FreeTextEntry1] : Referred by Dr. JESSEE Capellan PCP: Dr. Anna Stinson Gyn: Dr. Tessy Garcia Rad/Onc: Dr. Patti Collado Breast Surg: Dr. Anselmo Kamara Heme/Onc: Dr. Renee Staples Gen Surg: Dr. Lyle Brantley   Ms. VILLALPANDO is a 59 year old  female LMP , seen at the request of Dr. Capellan for further evaluation and management of a pelvic mass with elevated tumor markers. She originally presented to GYN Dr. Tessy Garcia in October with c/o abdominal fullness and bloating. She also reports that she sometimes feels the sensation of incomplete void. A palpable mass was noted on exam. A CT A/P was performed and revealed a 15 cm mass in the anterior pelvis. An MRI of the pelvis was also ordered for further evaluation. Findings included below. She denies bleeding or discharge. She denies nausea/vomiting, or other changes in bowel or urinary habits. She had PMB a few months ago and had an EMB that was negative but I don't have those results.   Of note, Ms. VILLALPANDO has a personal history of stage I ER/TN+ invasive ductal carcinoma of the right breast, diagnosed in 10/2023. She is status post right breast partial mastectomy with sentinel lymph node biopsy and tissue rearrangement reduction mastopexy in 2024. She is now status post 15 fractions of radiation to the right breast to a total of 4005cGy completed 3/25/24. She tolerated treatment well with expected acute side effects and mild skin reaction. Bilateral mammography and ultrasound in 2024 that demonstrated no mammographic or sonographic evidence of malignancy and MRIs at 6-month intervals - next due 2025. She is currently MISTY and is on anastrozole (started in 2024, will continue for 5 years)  Of note, she was also referred to gen surg Dr. Lyle Brantley for discussion of umbilical hernia repair at the time of her pelvic surgery, when the time comes.   She is currently not working; She is  and lives with her daughter in [place].   [facility] [employer] Analyses 2023 by Dr. Ran Story - No mutations detected (Reports in chart)  2025 TM  = 55 CEA = 4.7 Ca 19-9 = <2   10/21/2024 MRI Pelvis - Lobulated T1 hyperintense structure measuring up to 13.8 cm extending from the right lower quadrant into the pelvis with differential considerations including right proteinaceous paraovarian cyst versus peritoneal inclusion cyst - Uterine fibroids measuring up to 2.1 cm including a subcentimeter submucosal/intracavitary fibroid - 0.6 cm heterogeneous structure within the fundal endometrial canal possibly representing additional submucosal fibroid versus polyp  10/4/2024 CT A/P 15.1 cm lobulated low-density mass in the anterior pelvis that likely corresponds to the palpable abnormality. This abuts and appears inseparable from the right ovary. Differential considerations include complex paraovarian cyst, complex peritoneal inclusion cyst, and solid mass such as desmoid tumor.   PMHx- Diabetes, obesity, breast cancer, HTN, HLD PSHx- Ovarian cystectomy and myomectomy, right breast partial mastectomy with bracketed localization and sentinel lymph node biopsy with tissue rearrangement reduction mastopexy (2024, [facility]) Family hx of cancer- Maternal cousin with colon cancer at age 65. Maternal cousin with breast cancer at age 60. Maternal aunt with multiple myeloma at age 70.   HM Pap- 2024, Negative Mammo- 2024 Colonoscopy- 2024

## 2025-02-06 NOTE — PHYSICAL EXAM
[Chaperone Present] : A chaperone was present in the examining room during all aspects of the physical examination [97740] : A chaperone was present during the pelvic exam. [FreeTextEntry2] : Ernestina [Absent] : CVAT: absent [Abnormal] : Bimanual Exam: Abnormal [Normal] : Recto-Vaginal Exam: Normal [FreeTextEntry1] : morbidly obese and not happy with her diagnosis [de-identified] : large panus with macerated skin under the fold. No cellulitis. umbilical hernia not tender but difficult to reduce. no ascites fullness in the upper pelvis [de-identified] : 15 cm mass under the umbilicus [de-identified] : umbilical hernia [de-identified] : macerated skin under panus [de-identified] : she appears a little angry/annoyed [de-identified] : pulled up into the pelvis. O/W normal in appearance [de-identified] : pulled up and difficult to feel. I think it is slightly enlarged [de-identified] : central pelvic mass anterior and above uterus. smooth, NT. [de-identified] : utrus pulled up and mass palpable above it.  [Fully active, able to carry on all pre-disease performance without restriction] : Status 0 - Fully active, able to carry on all pre-disease performance without restriction

## 2025-02-06 NOTE — PHYSICAL EXAM
[Chaperone Present] : A chaperone was present in the examining room during all aspects of the physical examination [36745] : A chaperone was present during the pelvic exam. [FreeTextEntry2] : Ernestina [Absent] : CVAT: absent [Abnormal] : Bimanual Exam: Abnormal [Normal] : Recto-Vaginal Exam: Normal [FreeTextEntry1] : morbidly obese and not happy with her diagnosis [de-identified] : large panus with macerated skin under the fold. No cellulitis. umbilical hernia not tender but difficult to reduce. no ascites fullness in the upper pelvis [de-identified] : 15 cm mass under the umbilicus [de-identified] : umbilical hernia [de-identified] : macerated skin under panus [de-identified] : she appears a little angry/annoyed [de-identified] : pulled up into the pelvis. O/W normal in appearance [de-identified] : pulled up and difficult to feel. I think it is slightly enlarged [de-identified] : central pelvic mass anterior and above uterus. smooth, NT. [de-identified] : utrus pulled up and mass palpable above it.  [Fully active, able to carry on all pre-disease performance without restriction] : Status 0 - Fully active, able to carry on all pre-disease performance without restriction

## 2025-02-14 NOTE — DISCUSSION/SUMMARY
[Reviewed Clinical Lab Test(s)] : Results of clinical tests were reviewed. [Reviewed Radiology Report(s)] : Radiology reports were reviewed. [Discuss Alternatives/Risks/Benefits w/Patient] : All alternatives, risks, and benefits were discussed with the patient/family and all questions were answered.  Patient expressed good understanding and appreciates the importance of follow up as recommended. [Visit Time ___ Minutes] : [unfilled] minutes [Face to Face Time___ Minutes] : with [unfilled] minutes in face to face consultation. [FreeTextEntry1] : 60yo P1 w/ personal hx of ER/IL+ breast ca dx'd 10/203 s/p surgery and RT. Currently on anastrazole. Pt with pelvic imaging revealing right pelvic mass, fibroid uterus, thickened EE, and labs with elevated ca125 and cea. Pt also w/ sighx of [facility] myomectomy with morcellation in 1994. For further tx planning -more than 50% of visit spent face to face with patient reviewing records and interpreting imaging/path/lab results, counseling and coordinating care -I reviewed imaging, labs, and exam findings. 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 I recommended rpt MRI to asses if any interval change in size and re-eval if separate or attached from ovary since there was discrepancy in this on prior imagining. I agreed with plan for surgical resection and would also recommend BSO at the least given hx of ER/IL+ breast cancer and that removal of ovaries would be therapeutic and prevent recurrence.  -I reviewed her hx of PMB and thickened EE. She had embx 9/2024 that was negative however EE was 7mm so this is a discrepancy. I recommended rpt embx today and will re-eval EE on next imaging. If still thickened and embx benign, I recommend D&C/[facility] for more definitive assessement of EM cavity prior to abdominal surgery as pt prefers to retain uterus if she does not have to remove it for pre-cancer or cancer process. all questions answered and patient expressed understanding -pelvic MRI -rpt ca125 and cea to trend -televisit for results review and to finalize tx plan -pain/fever/bleeding precautions given

## 2025-02-14 NOTE — DISCUSSION/SUMMARY
[Reviewed Clinical Lab Test(s)] : Results of clinical tests were reviewed. [Reviewed Radiology Report(s)] : Radiology reports were reviewed. [Discuss Alternatives/Risks/Benefits w/Patient] : All alternatives, risks, and benefits were discussed with the patient/family and all questions were answered.  Patient expressed good understanding and appreciates the importance of follow up as recommended. [Visit Time ___ Minutes] : [unfilled] minutes [Face to Face Time___ Minutes] : with [unfilled] minutes in face to face consultation. [FreeTextEntry1] : 60yo P1 w/ personal hx of ER/MO+ breast ca dx'd 10/203 s/p surgery and RT. Currently on anastrazole. Pt with pelvic imaging revealing right pelvic mass, fibroid uterus, thickened EE, and labs with elevated ca125 and cea. Pt also w/ [place]x [place] [facility] [place] 1994. For further tx planning -more than 50% of visit spent face to face with patient reviewing records and interpreting imaging/path/lab results, counseling and coordinating care -I reviewed imaging, labs, and exam findings. I explained that based on hx of 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 I recommended rpt MRI to asses if any interval change in size and re-eval if separate or attached from ovary since there was discrepancy in this on prior imagining. I agreed with plan for surgical resection and would also recommend BSO at the least given hx of ER/MO+ breast cancer and that removal of ovaries would be therapeutic and prevent recurrence.  -I reviewed her hx of PMB and thickened EE. She had embx 9/2024 that was negative however EE was 7mm so this is a discrepancy. I recommended rpt embx today and will re-eval EE on next imaging. If still thickened and embx benign, I [place] [place] definitive assessement of EM cavity prior to abdominal surgery as pt prefers to retain uterus if she does not have to remove it for pre-cancer or cancer process. all questions answered and patient expressed understanding -pelvic MRI -rpt ca125 and cea to trend -televisit for results review and to finalize tx plan -pain/fever/bleeding precautions given

## 2025-02-14 NOTE — PROCEDURE
[Endometrial Biopsy] : an endometrial biopsy [Postmenopausal Bleeding] : postmenopausal bleeding [Thickened Endometrium] : thickened endometrium [Patient] : the patient [Written consent] : written consent was obtained prior to the procedure and is detailed in the patient's record [Betadine] : betadine [Yes] : the specimen was sent to pathology [No Complications] : none [Tolerated Well] : the patient tolerated the procedure well [FreeTextEntry1] : BME done. Tenaculum placed on anterior lip of vagina. Pipelle sounded to 8cm and scant tissue obtained with single pass. Tenaculum removed and no bleeding at site.

## 2025-02-14 NOTE — PHYSICAL EXAM
[Chaperone Present] : A chaperone was present in the examining room during all aspects of the physical examination [21134] : A chaperone was present during the pelvic exam. [Fully active, able to carry on all pre-disease performance without restriction] : Status 0 - Fully active, able to carry on all pre-disease performance without restriction

## 2025-02-14 NOTE — PHYSICAL EXAM
[Chaperone Present] : A chaperone was present in the examining room during all aspects of the physical examination [20435] : A chaperone was present during the pelvic exam. [Fully active, able to carry on all pre-disease performance without restriction] : Status 0 - Fully active, able to carry on all pre-disease performance without restriction

## 2025-02-14 NOTE — HISTORY OF PRESENT ILLNESS
[FreeTextEntry1] : 60yo  LMP 2016 age 52yo, referred for pelvic mass and elevated tumor markers. Pt with hx of ER/OH+ invasive ductal carcinoma of the right breast, dx 10/2023, s/p right partial mastectomy with slnbx and tissue rearrangement reduction mastopexy in 2024 with Dr Kamara, followed by radiation with Dr Collado. She is on Anastrazole started 2024 follows with [facility]. Genetic testing negative. Pt had not seen GYN in aprox 10yrs, after breast ca diagnosis she returned to GYN care and obtained annual visit, bimanual palpated mass and imaging performed. Pt also reports 2 episodes of PMB in  and . She was sent to both GYN surgeon (Timi), GYN Onc (Vira) and gen surgeon (Fercho) for consultation for mass and umbilical hernia. She reports she feels fullness and bloating despite recent weight loss of 70lbs. Also, with some early satiety since breast cancer diagnosis. denies n/v/fever/bleeding/bloating. Reports normal urination and BMs.   PMHx: HTN, DM, HLD, DCIS ER/OH+ PSHx:  - LSC myomectomy/ovarian cystectomy,  Lasix,  - meniscus, left knee, 2024 partial mastectomy and reduction OBGYNHx:  at 27wks, child now 29yo;  hx of fibroids/cysts/abn paps/stis, was lost to GYN care for many years and now established care with Tessy Garcia MD FamHx: maternal aunt with multiple myeloma, cousin with stage 4 colon ca dod 66yo; genetic testing at time of DCIS diagnosis - negative, denies gyn ca SocHx: occasional etoh, denies smoking/illicit drugs All: Lisinopril - anaphylaxis   mammogram: up to date, MRI due in 2025 colonoscopy: done 2025, 3 yr recall   Labs: 25:  = 55 CEA = 4.7 Ca 19-9 = <2  10/21/2024 MRI Pelvis - Lobulated T1 hyperintense structure measuring up to 13.8 cm extending from the right lower quadrant into the pelvis with differential considerations including right proteinaceous paraovarian cyst versus peritoneal inclusion cyst - Uterine fibroids measuring up to 2.1 cm including a subcentimeter submucosal/intracavitary fibroid - 0.6 cm heterogeneous structure within the fundal endometrial canal possibly representing additional submucosal fibroid versus polyp  10/4/2024 CT A/P 15.1 cm lobulated low-density mass in the anterior pelvis that likely corresponds to the palpable abnormality. This abuts and appears inseparable from the right ovary. Differential considerations include complex paraovarian cyst, complex peritoneal inclusion cyst, and solid mass such as desmoid tumor.

## 2025-02-14 NOTE — HISTORY OF PRESENT ILLNESS
[FreeTextEntry1] : 58yo  LMP 2016 age 50yo, referred for pelvic mass and elevated tumor markers. Pt with hx of ER/KY+ invasive ductal carcinoma of the right breast, dx 10/2023, s/p right partial mastectomy with slnbx and tissue rearrangement reduction mastopexy in 2024 with Dr Kamara, followed by radiation with Dr Collado. She is on Anastrazole started 2024 follows with [facility]. Genetic testing negative. Pt had not seen GYN in aprox 10yrs, after breast ca diagnosis she returned to GYN care and obtained annual visit, bimanual palpated mass and imaging performed. Pt also reports 2 episodes of PMB in  and . She was sent to both GYN surgeon (Timi), GYN Onc (Vira) and gen surgeon (Fercho) for consultation for mass and umbilical hernia. She reports she feels fullness and bloating despite recent weight loss of 70lbs. Also, with some early satiety since breast cancer diagnosis. denies n/v/fever/bleeding/bloating. Reports normal urination and BMs.   PMHx: HTN, DM, HLD, DCIS ER/KY+ PSHx:  - LSC myomectomy/ovarian cystectomy,  Lasix,  - meniscus, left knee, 2024 partial mastectomy and reduction OBGYNHx:  at 27wks, child now 31yo;  hx of fibroids/cysts/abn paps/stis, was lost to GYN care for many years and now established care with Tessy Garcia MD FamHx: maternal aunt with multiple myeloma, cousin with stage 4 colon ca dod 64yo; genetic testing at time of DCIS diagnosis - negative, denies gyn ca SocHx: occasional etoh, denies smoking/illicit drugs All: Lisinopril - anaphylaxis   mammogram: up to date, MRI due in 2025 colonoscopy: done 2025, 3 yr recall   Labs: 25:  = 55 CEA = 4.7 Ca 19-9 = <2  10/21/2024 MRI Pelvis - Lobulated T1 hyperintense structure measuring up to 13.8 cm extending from the right lower quadrant into the pelvis with differential considerations including right proteinaceous paraovarian cyst versus peritoneal inclusion cyst - Uterine fibroids measuring up to 2.1 cm including a subcentimeter submucosal/intracavitary fibroid - 0.6 cm heterogeneous structure within the fundal endometrial canal possibly representing additional submucosal fibroid versus polyp  10/4/2024 CT A/P 15.1 cm lobulated low-density mass in the anterior pelvis that likely corresponds to the palpable abnormality. This abuts and appears inseparable from the right ovary. Differential considerations include complex paraovarian cyst, complex peritoneal inclusion cyst, and solid mass such as desmoid tumor.

## 2025-02-14 NOTE — PHYSICAL EXAM
[Chaperone Present] : A chaperone was present in the examining room during all aspects of the physical examination [19122] : A chaperone was present during the pelvic exam. [Fully active, able to carry on all pre-disease performance without restriction] : Status 0 - Fully active, able to carry on all pre-disease performance without restriction

## 2025-02-14 NOTE — HISTORY OF PRESENT ILLNESS
[FreeTextEntry1] : 60yo  LMP 2016 age 50yo, referred for pelvic mass and elevated tumor markers. 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 She is on Anastrazole started 2024 follows with [facility]. Genetic testing negative. Pt had not seen GYN in aprox 10yrs, after breast ca diagnosis she returned to GYN care and obtained annual visit, bimanual palpated mass and imaging performed. Pt also reports 2 episodes of PMB in  and . She was sent to both GYN surgeon (Timi), GYN Onc (Vira) and gen surgeon (Fercho) for consultation for mass and umbilical hernia. She reports she feels fullness and bloating despite recent weight loss of 70lbs. Also, with some early satiety since breast cancer diagnosis. denies n/v/fever/bleeding/bloating. Reports normal urination and BMs.   PMHx: HTN, DM, HLD, DCIS ER/VT+ PSHx:  - LSC myomectomy/ovarian cystectomy,  Lasix,  - meniscus, left knee, 2024 partial mastectomy and reduction OBGYNHx:  at 27wks, child now 29yo;  hx of fibroids/cysts/abn paps/stis, was lost to GYN care for many years and now established care with Tessy Garcia MD FamHx: maternal aunt with multiple myeloma, cousin with stage 4 colon ca dod 64yo; genetic testing at time of DCIS diagnosis - negative, denies gyn ca SocHx: occasional etoh, denies smoking/illicit drugs All: Lisinopril - anaphylaxis   mammogram: up to date, MRI due in 2025 colonoscopy: done 2025, 3 yr recall   Labs: 25:  = 55 CEA = 4.7 Ca 19-9 = <2  10/21/2024 MRI Pelvis - Lobulated T1 hyperintense structure measuring up to 13.8 cm extending from the right lower quadrant into the pelvis with differential considerations including right proteinaceous paraovarian cyst versus peritoneal inclusion cyst - Uterine fibroids measuring up to 2.1 cm including a subcentimeter submucosal/intracavitary fibroid - 0.6 cm heterogeneous structure within the fundal endometrial canal possibly representing additional submucosal fibroid versus polyp  10/4/2024 CT A/P 15.1 cm lobulated low-density mass in the anterior pelvis that likely corresponds to the palpable abnormality. This abuts and appears inseparable from the right ovary. Differential considerations include complex paraovarian cyst, complex peritoneal inclusion cyst, and solid mass such as desmoid tumor.

## 2025-02-14 NOTE — DISCUSSION/SUMMARY
[Reviewed Clinical Lab Test(s)] : Results of clinical tests were reviewed. [Reviewed Radiology Report(s)] : Radiology reports were reviewed. [Discuss Alternatives/Risks/Benefits w/Patient] : All alternatives, risks, and benefits were discussed with the patient/family and all questions were answered.  Patient expressed good understanding and appreciates the importance of follow up as recommended. [Visit Time ___ Minutes] : [unfilled] minutes [Face to Face Time___ Minutes] : with [unfilled] minutes in face to face consultation. [FreeTextEntry1] : 58yo P1 w/ personal hx of ER/AZ+ breast ca dx'd 10/203 s/p surgery and RT. Currently on anastrazole. Pt with pelvic imaging revealing right pelvic mass, fibroid uterus, thickened EE, and labs with elevated ca125 and cea. Pt also w/ sighx of [facility] myomectomy with morcellation in 1994. For further tx planning -more than 50% of visit spent face to face with patient reviewing records and interpreting imaging/path/lab results, counseling and coordinating care -I reviewed imaging, labs, and exam findings. I explained that based on hx of [facility] myomectomy with morcellation this mass is possibly a parasitic fibroid that has grown overtime. I recommended rpt MRI to asses if any interval change in size and re-eval if separate or attached from ovary since there was discrepancy in this on prior imagining. I agreed with plan for surgical resection and would also recommend BSO at the least given hx of ER/AZ+ breast cancer and that removal of ovaries would be therapeutic and prevent recurrence.  -I reviewed her hx of PMB and thickened EE. She had embx 9/2024 that was negative however EE was 7mm so this is a discrepancy. I recommended rpt embx today and will re-eval EE on next imaging. If still thickened and embx benign, I recommend D&C/[facility] for more definitive assessement of EM cavity prior to abdominal surgery as pt prefers to retain uterus if she does not have to remove it for pre-cancer or cancer process. all questions answered and patient expressed understanding -pelvic MRI -rpt ca125 and cea to trend -televisit for results review and to finalize tx plan -pain/fever/bleeding precautions given

## 2025-02-18 NOTE — CONSULT LETTER
[Dear  ___] : Dear  [unfilled], [Consult Letter:] : I had the pleasure of evaluating your patient, [unfilled]. [Please see my note below.] : Please see my note below. [Consult Closing:] : Thank you very much for allowing me to participate in the care of this patient.  If you have any questions, please do not hesitate to contact me. [Sincerely,] : Sincerely, [FreeTextEntry3] : Renee Staples DO, FACCLARK, FACP Medical Oncology and Hematology [facility]

## 2025-02-18 NOTE — CONSULT LETTER
[Dear  ___] : Dear  [unfilled], [Consult Letter:] : I had the pleasure of evaluating your patient, [unfilled]. [Please see my note below.] : Please see my note below. [Consult Closing:] : Thank you very much for allowing me to participate in the care of this patient.  If you have any questions, please do not hesitate to contact me. [Sincerely,] : Sincerely, [FreeTextEntry3] : Renee Staples DO, FACCLARK, FACP Medical Oncology and Hematology [facility]

## 2025-02-18 NOTE — HISTORY OF PRESENT ILLNESS
[de-identified] : 58 year old female with a PMHX of HTN, HLD, DM,  that presents for intial consultation of R breast cancer.   Oncological History:   2021 right breast bx fibroadenomatoid changes and benign calcifications.  2023 mammogram - regional microcalcifications at the posterior upper outer aspect of the right breast appears slightly increased in number. Partially circumscribed mass at the posterior upper outer aspect of the right breast with increased associated microcalcifications. No mammographic evidence of malignancy on the left.  10/24/2023 biopsy right breast upper outer quadrant mass with calcifications : ductal carcinoma in situ, nuclear grade II-III, papillary and cribiform types associated with calcifications. ER/MT positive  2023 pt was evaluated by genetic counselor  2023 MRI bilateral breasts right breast upper outer quadrant, middle third, there is a focal noncontiguous clumped nonmass enhancement in association with 2 biopsy markers. It measures approximately 4cm AP x 3.5cm TV by 3.5cm  2024 Status post right breast partial mastectomy with bracketed localization and sentinel lymph node biopsy with tissue rearrangement reduction mastopexy Path: tumor size 13mm single focus of invasive carcinoma - ductal DCIS is present. Nuclear grade II, Microcalcifications present in DCIS ER 95% MT 70% HER2 negative by FISH   Oncotype 18  Breast Cancer Risk Factors: Menarche: 11 Date of LMP: 50 Menopause: 50  Age at first live birth: 29 Nursed: no Hysterectomy: no  Oophorectomy: no OCP: yes 6-7 years  HRT: no Last pap/pelvic exam: has not been in many years Related family history states many of her family was half siblings M aunt MM M cousin breast ca M cousin colon ca Ashkenazi: no BRCA testing: negative  [de-identified] : Patient here for follow up. Continues on anastrozole. Does report hot flashes, night sweats and fatigue. Takes Veozah which only helps at times. Does feel like it disrupting her sleep. She will wake up from hot flashes and then be unable to go back to sleep after.   Bone density: 2/15/24 MRI due in 4/25 MAMMO 9/24

## 2025-02-18 NOTE — PHYSICAL EXAM
[Fully active, able to carry on all pre-disease performance without restriction] : Status 0 - Fully active, able to carry on all pre-disease performance without restriction [Normal] : affect appropriate [de-identified] : Status post right breast partial mastectomy with sentinel lymph node biopsy with tissue rearrangement reduction mastopexy- healed well. L breast mastopexy no masses or axillary adenopathy.

## 2025-02-18 NOTE — REVIEW OF SYSTEMS
[Night Sweats] : night sweats [Recent Change In Weight] : ~T recent weight change [Diarrhea: Grade 0] : Diarrhea: Grade 0 [Joint Pain] : joint pain [Joint Stiffness] : joint stiffness [Hot Flashes] : hot flashes [Negative] : Allergic/Immunologic [Patient Intake Form Reviewed] : Patient intake form was reviewed [Fatigue] : fatigue [Anxiety] : anxiety [Chills] : no chills [Shortness Of Breath] : no shortness of breath [Abdominal Pain] : no abdominal pain [FreeTextEntry9] : back pain, joint stiffness to hands [de-identified] : expresses feelings of anger at her situation  [de-identified] : takes veozah

## 2025-02-18 NOTE — ASSESSMENT
[FreeTextEntry1] : # 13mm single focus of invasive carcinoma -, grade II, N0, ER 95% WA 70% HER2 negative by FISH 1/8/2024 Status post right breast partial mastectomy with bracketed localization and sentinel lymph node biopsy with tissue rearrangement reduction mastopexy Oncotype 18 - no need for chemo in addition to endocrine therapy completed RT and started on Anastrozole, ca++ and vit D (3/24) DEXA reviewed - normal bone density 8/27/24 - vs and labs reviewed. labs drawn in office today. continue on anastrozole, ca++ and vit D. follow up with Dr. Kamara 10/8/24.  bilateral mammography and ultrasound will be due in September 26, 2024. MRI should be performed around March 2025. reviewed note from Dr. Lerman. Wanting revision. 2/18/25- vs and labs reviewed. labs drawn in office today. wbc 6.41, hgb 13.1, plts 245. due for MRI 4/25. mammo reviewed note form Dr. Collado 1/30/25. sees Dr. Kamara in 4/2025.  continue anastrozole, ca++ and vit D  # pelvic mass saw GYN ONC - reviewed note by Dr. Faulkner. needs pelvic MRI prior to deciding on surgery  .  # hot flashes started on veozah did not tolerate effexor will try gabapentin 100 mg po at night and then expand if it is helping  # Vit D deficiency 45 - recommend at least 1000 IU  RTC in 4-6 months. check cbc with diff, cmp, ESR/CRP, MAG, phos, vit D

## 2025-02-18 NOTE — REVIEW OF SYSTEMS
[Night Sweats] : night sweats [Recent Change In Weight] : ~T recent weight change [Diarrhea: Grade 0] : Diarrhea: Grade 0 [Joint Pain] : joint pain [Joint Stiffness] : joint stiffness [Hot Flashes] : hot flashes [Negative] : Allergic/Immunologic [Patient Intake Form Reviewed] : Patient intake form was reviewed [Fatigue] : fatigue [Anxiety] : anxiety [Chills] : no chills [Shortness Of Breath] : no shortness of breath [Abdominal Pain] : no abdominal pain [FreeTextEntry9] : back pain, joint stiffness to hands [de-identified] : expresses feelings of anger at her situation  [de-identified] : takes veozah

## 2025-02-18 NOTE — HISTORY OF PRESENT ILLNESS
[de-identified] : 58 year old female with a PMHX of HTN, HLD, DM,  that presents for intial consultation of R breast cancer.   Oncological History:   2021 right breast bx fibroadenomatoid changes and benign calcifications.  2023 mammogram - regional microcalcifications at the posterior upper outer aspect of the right breast appears slightly increased in number. Partially circumscribed mass at the posterior upper outer aspect of the right breast with increased associated microcalcifications. No mammographic evidence of malignancy on the left.  10/24/2023 biopsy right breast upper outer quadrant mass with calcifications : ductal carcinoma in situ, nuclear grade II-III, papillary and cribiform types associated with calcifications. ER/CO positive  2023 pt was evaluated by genetic counselor  2023 MRI bilateral breasts right breast upper outer quadrant, middle third, there is a focal noncontiguous clumped nonmass enhancement in association with 2 biopsy markers. It measures approximately 4cm AP x 3.5cm TV by 3.5cm  2024 Status post right breast partial mastectomy with bracketed localization and sentinel lymph node biopsy with tissue rearrangement reduction mastopexy Path: tumor size 13mm single focus of invasive carcinoma - ductal DCIS is present. Nuclear grade II, Microcalcifications present in DCIS ER 95% CO 70% HER2 negative by FISH   Oncotype 18  Breast Cancer Risk Factors: Menarche: 11 Date of LMP: 50 Menopause: 50  Age at first live birth: 29 Nursed: no Hysterectomy: no  Oophorectomy: no OCP: yes 6-7 years  HRT: no Last pap/pelvic exam: has not been in many years Related family history states many of her family was half siblings M aunt MM M cousin breast ca M cousin colon ca Ashkenazi: no BRCA testing: negative  [de-identified] : Patient here for follow up. Continues on anastrozole. Does report hot flashes, night sweats and fatigue. Takes Veozah which only helps at times. Does feel like it disrupting her sleep. She will wake up from hot flashes and then be unable to go back to sleep after.   Bone density: 2/15/24 MRI due in 4/25 MAMMO 9/24

## 2025-02-18 NOTE — ASSESSMENT
[FreeTextEntry1] : # 13mm single focus of invasive carcinoma -, grade II, N0, ER 95% OR 70% HER2 negative by FISH 1/8/2024 Status post right breast partial mastectomy with bracketed localization and sentinel lymph node biopsy with tissue rearrangement reduction mastopexy Oncotype 18 - no need for chemo in addition to endocrine therapy completed RT and started on Anastrozole, ca++ and vit D (3/24) DEXA reviewed - normal bone density 8/27/24 - vs and labs reviewed. labs drawn in office today. continue on anastrozole, ca++ and vit D. follow up with Dr. Kamara 10/8/24.  bilateral mammography and ultrasound will be due in September 26, 2024. MRI should be performed around March 2025. reviewed note from Dr. Lerman. Wanting revision. 2/18/25- vs and labs reviewed. labs drawn in office today. wbc 6.41, hgb 13.1, plts 245. due for MRI 4/25. mammo reviewed note form Dr. Collado 1/30/25. sees Dr. Kamara in 4/2025.  continue anastrozole, ca++ and vit D  # pelvic mass saw GYN ONC - reviewed note by Dr. Faulkner. needs pelvic MRI prior to deciding on surgery  .  # hot flashes started on veozah did not tolerate effexor will try gabapentin 100 mg po at night and then expand if it is helping  # Vit D deficiency 45 - recommend at least 1000 IU  RTC in 4-6 months. check cbc with diff, cmp, ESR/CRP, MAG, phos, vit D

## 2025-02-18 NOTE — PHYSICAL EXAM
[Fully active, able to carry on all pre-disease performance without restriction] : Status 0 - Fully active, able to carry on all pre-disease performance without restriction [Normal] : affect appropriate [de-identified] : Status post right breast partial mastectomy with sentinel lymph node biopsy with tissue rearrangement reduction mastopexy- healed well. L breast mastopexy no masses or axillary adenopathy.

## 2025-02-26 NOTE — DISCUSSION/SUMMARY
[Reviewed Clinical Lab Test(s)] : Results of clinical tests were reviewed. [Reviewed Radiology Report(s)] : Radiology reports were reviewed. [Discuss Alternatives/Risks/Benefits w/Patient] : All alternatives, risks, and benefits were discussed with the patient/family and all questions were answered.  Patient expressed good understanding and appreciates the importance of follow up as recommended. [Visit Time ___ Minutes] : [unfilled] minutes [Face to Face Time___ Minutes] : with [unfilled] minutes in face to face consultation. [FreeTextEntry1] : 58yo P1 w/ personal hx of ER/VT+ breast ca dx'd 10/203 s/p surgery and RT. Currently on anastrazole. Pt with pelvic imaging revealing right pelvic mass and thickened EE with biopsy confirmed high grade serous carcinoma. Pt CT c/a/p without definitive evidence of metastatic disease, nonspecific small lung findings. Pt for definitive surgical mgmt. -entire televisit spent counseling patient and coordinating care. -I reviewed in detail her diagnosis of high grade serous endometrial cancer and the natural hx of this disease. I reviewed standard of care surgical mgmt with hysterectomy/BSO/sentinel LN biopsy/possible full lymph node dissection. I reviewed R/B/A of surgery and surgical procedure in detail.  -Different surgical approaches discussed including minimally invasive and open approaches. I recommend advanced laparoscopic robotic assisted total hysterectomy and bilateral salpingo-oophorectomy with sentinel lymph node dissection. The NCCN guidelines with regards to endometrial cancer staging were discussed. If sentinel lymph node biopsy is unsuccessful, or if the sentinel lymph node is found to be positive for disease, a full lymph node dissection will be performed on that side.  -I did explain to patient that given her BMI 39, discretion at time of surgery will be used to determine risk/benefit of full node dissection and obesity associated complications. -Surgical booking: robo tlh/bso/slnbx/omx/pelvic mass resection/possible laparotomy -[facility]AS, pre-op clearance at [facility], labs, covid testing as pre protocol -Complications that include, but are not limited to: bleeding, infection, injury to other organs including bowel, bladder, ureters, blood vessels, nerves; infections, blood clots, lymphedema, pneumonia, wound complications and prolonged hospital stay have all been discussed with the patient. Whenever minimally invasive surgery is attempted, there is a chance of needing to convert to laparotomy. The risk of occult injury requiring additional surgery also discussed. I have also provided her with the diagrams.   -the above diagnosis, nature of treatment, procedure, options, benefits, and risks were reviewed. I explained in detail the possible complications including but not limited to bleeding, transfusion, transfusion complications, infection, injury to internal organs such as injury to gastrointestinal or urinary tract, possible fistula formation, prolonged catherization, intestinal or urinary tract obstruction, vascular injury, wound complications, venous thrombosis, pulmonary embolus, sepsis, pain, chronic disability, and fatal complications, possible re-operation to correct complications, and possible abandoning or modifying the procedure due to inoperative findings was discussed. All questions were answered. Patient verbally indicated that she understood the nature of treatment, indications, options, benefits, and risks. Patient elected and consented to the procedure. Pt was informed that there was no guarantee for outcome or cure, and that additional therapy may be indicated depending on the operative findings.  -f/u post-op.

## 2025-02-26 NOTE — DISCUSSION/SUMMARY
[Reviewed Clinical Lab Test(s)] : Results of clinical tests were reviewed. [Reviewed Radiology Report(s)] : Radiology reports were reviewed. [Discuss Alternatives/Risks/Benefits w/Patient] : All alternatives, risks, and benefits were discussed with the patient/family and all questions were answered.  Patient expressed good understanding and appreciates the importance of follow up as recommended. [Visit Time ___ Minutes] : [unfilled] minutes [Face to Face Time___ Minutes] : with [unfilled] minutes in face to face consultation. [Reviewed Radiology Film/Image(s)] : Images from radiology studies were reviewed and examined. [FreeTextEntry1] : 58yo P1 w/ personal hx of ER/MA+ breast ca dx'd 10/203 s/p surgery and RT. Currently on anastrazole. Pt with pelvic imaging revealing right pelvic mass and thickened EE with biopsy confirmed high grade serous carcinoma. Pt CT c/a/p without definitive evidence of metastatic disease, nonspecific small lung findings. Pt for definitive surgical mgmt. -entire televisit spent counseling patient and coordinating care. -I reviewed in detail her diagnosis of high grade serous endometrial cancer and the natural hx of this disease. I reviewed standard of care surgical mgmt with hysterectomy/BSO/sentinel LN biopsy/possible full lymph node dissection. I reviewed R/B/A of surgery and surgical procedure in detail.  -Different surgical approaches discussed including minimally invasive and open approaches. I recommend advanced laparoscopic robotic assisted total hysterectomy and bilateral salpingo-oophorectomy with sentinel lymph node dissection and omentectomy. I did explain possible laparotomy in event unable to complete surgery robotic. The NCCN guidelines with regards to endometrial cancer staging were discussed. If sentinel lymph node biopsy is unsuccessful, or if the sentinel lymph node is found to be positive for disease, a full lymph node dissection will be performed on that side.  -I did explain to patient that given her BMI 39, discretion at time of surgery will be used to determine risk/benefit of full node dissection and obesity associated complications. -Surgical booking: robo tlh/bso/slnbx/omx/pelvic mass resection/possible laparotomy -[facility]AS, pre-op clearance at [facility], labs, covid testing as pre protocol. Pt cleared at [facility]. -the above diagnosis, nature of treatment, procedure, options, benefits, and risks were reviewed. I explained in detail the possible complications including but not limited to bleeding, transfusion, transfusion complications, infection, injury to internal organs such as injury to gastrointestinal or urinary tract, possible fistula formation, prolonged catherization, intestinal or urinary tract obstruction, vascular injury, wound complications, venous thrombosis, pulmonary embolus, sepsis, pain, chronic disability, and fatal complications, possible re-operation to correct complications, and possible abandoning or modifying the procedure due to inoperative findings was discussed. All questions were answered. Patient verbally indicated that she understood the nature of treatment, indications, options, benefits, and risks. Patient elected and consented to the procedure. Pt was informed that there was no guarantee for outcome or cure, and that additional therapy may be indicated depending on the operative findings.  -f/u post-op.  -pain/fever/bleeding precautions given

## 2025-02-26 NOTE — HISTORY OF PRESENT ILLNESS
Please make TCM call.  
TRANSITIONAL CARE MANAGEMENT - HOSPITAL DISCHARGE FOLLOW-UP    Contacted Ms. Chapin regarding follow-up for diarrhea and leukocytosis after hospital discharge. She was discharged from the hospital on 3/27/19. Review of the After Visit Summary from the recent hospitalization indicates that the patient needs to follow up with Transplant Surgery 4/1/19 and Nephrology 4/23/19    She feels that she is doing OK at home.   Her diet concern is poor appetite. Overall, the patient is eating well.  Patient states she is not eating much because she has GERD.   Ambulation: staying the same  Fever: is not present  Pain: none  Activities of Daily Living (global): Partial assistance   Patient states that she does have sufficient family support. She feels that she is able to ask for assistance when needed.     Additional patient/family concerns: .    Discharge medications were verified with the patient. She is not compliant with the medication regimen prescribed at the time of discharge. Barriers to compliance include unable to get the medication from the pharmacy. Stressed to the patient the importance of taking the full regimen of medications as prescribed. She reports that she is not experiencing any medication side effects.    Upon discharge, the patient was to receive home healthcare services . These services have been initiated. These services have been scheduled and no further arrangements are needed at this time.     Patient wished not to schedule an appointment with Jin Harris MD at this time. Patient has a follow up on 4/1/19 with Transplant Surgery and 4/23/19 with Urology.     
[FreeTextEntry1] : 58yo P1 w/ personal hx of ER/CA+ breast ca dx'd 10/203 s/p surgery and RT. Currently on anastrazole. Pt with pelvic imaging revealing right pelvic mass, fibroid uterus, thickened EE, and labs with elevated ca125 and cea. Pt also w/ sighx of LSC myomectomy with morcellation in . Pt s/p embx at last visit and televisit today for results review and tx planning.  Since last visit pt without complaints. she had 1wk of bleeding following embx but that has resolved. She had ct c/a/p done given embx revealed high grade serous carcinoma. All results reviewed   PMHx: HTN, DM, HLD, DCIS ER/CA+ PSHx:  - LSC myomectomy/ovarian cystectomy,  Lasix,  - meniscus, left knee, 2024 partial mastectomy and reduction OBGYNHx:  at 27wks, child now 31yo;  hx of fibroids/cysts/abn paps/stis, was lost to GYN care for many years and now established care with Tessy Garcia MD FamHx: maternal aunt with multiple myeloma, cousin with stage 4 colon ca dod 66yo; genetic testing at time of DCIS diagnosis - negative, denies gyn ca SocHx: occasional etoh, denies smoking/illicit drugs All: Lisinopril - anaphylaxis   mammogram: up to date, MRI due in 2025 colonoscopy: done 2025, 3 yr recall   Labs: 25:  = 55 CEA = 4.7 Ca 19-9 = <2  Path Embx 25: FINAL PATHOLOGIC DIAGNOSIS ENDOMETRIAL BIOPSY: 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 Comments: Immunohistochemical stains show that the tumor is positive for p53 (strong and diffuse, mutated) and p16 (strong and diffuse), PAX8 (strong and diffuse), weakly positive for ER and CA. Ki67 proliferation index is high (>50%). This immunoprofile supports the above diagnosis.  CT c/a/p 25: CT chest impression = somewhat poorly defined 7mm and smaller subsolid nodular densities bilaterally with upper lung zone predilection are nonspecific and most likely inflammatory or post-inflammatory. less likely lung adenoca lesions. appearance is atypical for metastatic disease. bandlike left basilar opacity with somewhat nodular appearing component measuring 9x8mm is nonspecific and may be atelectasis or fibrosis. advise attention to short term follow up. likely focal thickened airway at the right base  CT a/p = impression = stable lobulated cystic structure in the pelvis measuring 12.5cm. possible paraovarian cyst or peritoneal inclusion cyst. no LAD. fat containing umbi hernia. left lower lobe nodular opacity measuring 0.8cm appears somewhat focal but is also an area of mild atelectasis. recommend f/u CT chest 3 months  10/21/2024 MRI Pelvis - Lobulated T1 hyperintense structure measuring up to 13.8 cm extending from the right lower quadrant into the pelvis with differential considerations including right proteinaceous paraovarian cyst versus peritoneal inclusion cyst - Uterine fibroids measuring up to 2.1 cm including a subcentimeter submucosal/intracavitary fibroid - 0.6 cm heterogeneous structure within the fundal endometrial canal possibly representing additional submucosal fibroid versus polyp  10/4/2024 CT A/P 15.1 cm lobulated low-density mass in the anterior pelvis that likely corresponds to the palpable abnormality. This abuts and appears inseparable from the right ovary. Differential considerations include complex paraovarian cyst, complex peritoneal inclusion cyst, and solid mass such as desmoid tumor.

## 2025-03-06 NOTE — HISTORY OF PRESENT ILLNESS
[FreeTextEntry1] : The patient is a 59-year-old G3, P1 postmenopausal woman.  She underwent menarche at age 11.  She has a family history with her paternal aunt with breast cancer at age 50.  She was found to have some suspicious calcifications seen on mammography from [facility] radiology in [facility] on October 13, 2021 and was seen by Dr. Fernandez at that time and underwent right breast 12:00 stereotactic core biopsy on November 17, 2021 just showing fibroadenomatoid change and benign calcifications.  Follow-up mammographies in 2022 showed likely vascular or milk of calcium calcifications.  A more recent bilateral mammography performed on September 11, 2023 showed increasing calcifications again in the upper outer aspect of the right breast as well as a partially circumscribed mass in the upper outer aspect of the right breast and stereotactic core biopsies were performed.  The upper outer quadrant mass with calcifications showed intermediate to high-grade DCIS which was ER/MD positive and she had an "hourglass" clip placed at that biopsy site and the upper outer quadrant calcifications showed intermediate grade DCIS again ER/MD positive with a "stoplight" clip placed at that biopsy site.  She underwent [facility] genetic panel testing in November 2023 which was negative.  MRI was performed on December 7, 2023 showing the area of the 2 clips with this localized upper outer quadrant region of non-mass like enhancement over an area of 4 x 3.5 x 3.5 cm.  I did review her postbiopsy mammographic imaging as well as her recent MRI and these 2 clips were fairly close together and she was felt to be a good candidate for partial mastectomy with bracketed localizations and she was seen by Dr. Lerman preoperatively and felt to be a good candidate for reduction mastopexy at the time of surgery.  [place] lymph node biopsy was also recommended given the extent of the resection being performed.  She underwent a right breast partial mastectomy with bracketed localizations and sentinel lymph node biopsy with tissue rearrangement reduction mastopexy performed by Dr. Lerman on January 8, 2024.  She did have bilateral free nipple skin grafts performed.  The final pathology showed 2 negative right axillary sentinel lymph nodes and 1 negative right axillary nonsentinel lymph node and the wide excision did show a 1.3 cm high-grade micropapillary type invasive duct cancer but no lymphovascular invasion.  The invasive cancer extended to the superior medial margin however all final margins were negative.  She did have extensive DCIS measuring 3.5 cm which was high-grade with close margins on the initial partial mastectomy but again all final margins were negative.  The invasive cancer was ER positive greater than 95%, MD positive at 70%, HER2 2+ equivocal but negative by FISH with a Ki-67 of 30%.  The excess right breast tissue removed by plastic surgery did have 1 focal area with DCIS measuring 1 cm but margins were all negative.  This cancer was a pathologic prognostic stage IA T1 cN0 M0 breast cancer. It was impossible to tell exactly where that area of DCIS originated from.  She did have an atypical intraductal papilloma seen in the excess breast tissue on the left.  The tumor was sent for an Oncotype recurrence score which was 18 and she was seen by Dr. Staples who recommended anastrozole and no chemotherapy.  She was seen by Dr. Collado and underwent external beam radiation which finished in March 2024.  She comes in now for routine follow-up.

## 2025-03-06 NOTE — PHYSICAL EXAM
[No Supraclavicular Adenopathy] : no supraclavicular adenopathy [No Cervical Adenopathy] : no cervical adenopathy [Clear to Auscultation Bilat] : clear to auscultation bilaterally [Examined in the supine and seated position] : examined in the supine and seated position [Symmetrical] : symmetrical [No dominant masses] : no dominant masses in right breast  [No dominant masses] : no dominant masses left breast [No Nipple Retraction] : no left nipple retraction [No Nipple Discharge] : no left nipple discharge [Breast Mass Right Breast ___cm] : no masses [Breast Mass Left Breast ___cm] : no masses [No Axillary Lymphadenopathy] : no left axillary lymphadenopathy [No Edema] : no edema [No Rashes] : no rashes [Breast Nipple Inversion] : nipples not inverted [Breast Nipple Retraction] : nipples not retracted [Breast Nipple Flattening] : nipples not flattened [Breast Nipple Fissures] : nipples not fissured [Breast Abnormal Lactation (Galactorrhea)] : no galactorrhea [Breast Abnormal Secretion Bloody Fluid] : no bloody discharge [Breast Abnormal Secretion Serous Fluid] : no serous discharge [Breast Abnormal Secretion Opalescent Fluid] : no milky discharge [de-identified] : On exam, the patient has done well from her right breast partial mastectomy with bracketed localizations and sentinel lymph node biopsy with tissue rearrangement reduction mastopexy by plastics performed in January 2024.  She had free nipple skin grafts performed and then underwent external beam radiation which finished in March 2024.  She has no evidence of recurrence in the right breast and no suspicious findings in the left breast.  She has no axillary, supraclavicular, or cervical adenopathy. [de-identified] : Status post partial mastectomy with bracketed localization and sentinel lymph node biopsy with tissue rearrangement reduction mastopexy by plastics with free nipple skin graft and then radiation with no evidence of recurrence.   [de-identified] : Status post reduction mastopexy for symmetry with free nipple skin graft with no suspicious findings.  The prior region of fat necrosis on the lateral aspect which was aspirated in 2024 has resolved. [de-identified] : Free nipple skin grafts with some mild epidermolysis.

## 2025-03-06 NOTE — REASON FOR VISIT
[Follow-Up: _____] : a [unfilled] follow-up visit [FreeTextEntry1] : The patient is a postmenopausal female with a family history of breast cancer who was seen by Dr. Fernandez back in 2021 and underwent a right breast 12:00 stereotactic core biopsy for calcifications which showed fibroadenomatoid change and benign calcifications.  She was found to have increasing calcifications in the right breast upper outer quadrant as well as a slight mammographic density on mammography from September 2023 and stereotactic core biopsies were performed in October 2023 showed high-grade DCIS which was ER/MD positive.  MRI showed enhancement over region of 4 x 3.5 cm but the clips were felt to be close together towards the upper outer aspect of the right breast and she underwent a right breast partial mastectomy with bracketed localization with sentinel lymph node biopsy and tissue rearrangement reduction mastopexy by plastic surgery on January 8, 2024.  The final pathology showed 3 negative right axillary lymph nodes and she ended up with a 1.3 cm high-grade micropapillary invasive duct cancer within the right breast partial mastectomy which was ER/MD positive HER2/christie negative by FISH with a Ki-67 of 30%.  There was surrounding DCIS but formal margins were all free and she had no lymphovascular invasion.  She was found to have a 1 cm focus of DCIS in some of the extra breast tissue removed during the mastopexy on the right side but this did not extend any margins.  This was a pathologic prognostic stage IA breast cancer.  The left reduction mastopexy tissue just showed an atypical intraductal papilloma which was benign.  She was presented at tumor board and it was felt that we could just proceed with radiation without further surgery and she underwent external beam radiation with Dr. Collado which finished in March 2024.  She was seen by Dr. Staples and Oncotype recurrence score was 18 and anastrozole was recommended.  She comes in now for routine follow-up. [FreeTextEntry2] : January 8, 2024

## 2025-03-06 NOTE — ASSESSMENT
[FreeTextEntry1] : The patient is a 59-year-old G3, P1 postmenopausal woman.  She underwent menarche at age 11.  She has a family history with her paternal aunt with breast cancer at age 50.  She was found to have some suspicious calcifications seen on mammography from [facility] radiology in [facility] on October 13, 2021 and was seen by Dr. Fernandez at that time and underwent right breast 12:00 stereotactic core biopsy on November 17, 2021 just showing fibroadenomatoid change and benign calcifications.  Follow-up mammographies in 2022 showed likely vascular or milk of calcium calcifications.  Another bilateral mammography performed on September 11, 2023 showed increasing calcifications again in the upper outer aspect of the right breast as well as a partially circumscribed mass in the upper outer aspect of the right breast and stereotactic core biopsies were performed.  The upper outer quadrant mass with calcifications showed intermediate to high-grade DCIS which was ER/DC positive (ER-95%, DC-50%) and she had an "hourglass" clip placed at that biopsy site and the upper outer quadrant calcifications showed intermediate grade DCIS again ER/DC positive (ER-95%, DC-90%) with a "stoplight" clip placed at that biopsy site.  She underwent [facility] genetic panel testing in November 2023 which was negative. MRI was performed on December 7, 2023 showing the area of the 2 clips with this localized upper outer quadrant region of non-mass like enhancement over an area of 4 x 3.5 x 3.5 cm. I did review her postbiopsy mammographic imaging as well as her recent MRI and these 2 clips were fairly close together and she was felt to be a good candidate for partial mastectomy with bracketed localizations and she was seen by Dr. Lerman preoperatively and felt to be a good candidate for reduction mastopexy at the time of surgery.  [place] lymph node biopsy was also recommended given the extent of the resection being performed.  She underwent a right breast partial mastectomy with bracketed localizations and sentinel lymph node biopsy with tissue rearrangement reduction mastopexy performed by Dr. Lerman on January 8, 2024.  The final pathology showed 2 negative right axillary sentinel lymph nodes and 1 negative right axillary nonsentinel lymph node and the wide excision did show a 1.3 cm high-grade micropapillary type invasive duct cancer but no lymphovascular invasion.  The invasive cancer extended to the superior medial margin however all final margins were negative.  She did have extensive DCIS measuring 3.5 cm which was high-grade with close margins on the initial partial mastectomy but again all final margins were negative.  The invasive cancer was ER positive greater than 95%, DC positive at 70%, HER2 2+ equivocal but negative by FISH with a Ki-67 of 30%.  The excess right breast tissue removed by plastic surgery did have 1 focal area with DCIS measuring 1 cm but margins were all negative.  This cancer was a pathologic prognostic stage IA T1c N0 M0 breast cancer. It was impossible to tell exactly where that separate area of DCIS originated from.  She did have an atypical intraductal papilloma seen in the excess breast tissue on the left.   The patient has been unhappy about her breast size which she thinks is too small but a significant amount of tissue had to be removed from her right breast upper outer quadrant in order to clear her margins. I did place fiducials at the site of the right breast upper outer quadrant partial mastectomy at the time of surgery for the radiation oncologist.  I did present her case in tumor board and there was agreement that we could just move forward with external beam radiation to the right breast and mastectomy was not required given the fact that this other incidental area of DCIS was focal and not extending to any of the margin of the excess tissue excised.  The invasive tumor had an Oncotype recurrence score of 18 and she was seen by Dr. Staples and was placed on anastrozole after radiation.  She was seen by Dr. Collado for radiation oncology evaluation and underwent hypofractionated radiation to the right breast which finished in March 2024.  The patient understoof the benefits for endocrine therapy and was placed on anastrozole by Dr. Staples.  She underwent her last bilateral mammography and ultrasound which was reviewed from September 26, 2024 performed at [facility] which showed bilateral postsurgical changes with a seroma measuring 2 cm seen in the left breast. On exam, the patient had some fat necrosis felt on the lateral aspect of the left chest wall which was related to her reduction mastopexy and was aspirated back in 2024 with resolution of the mass.  She has done well from her right breast radiation and does have some radiation changes to the skin but no evidence of recurrence.  Her free nipple grafts have healed well.  She was reassured and I would like to see her again in another 6 months for routine follow-up.  She should follow-up with Dr. Lerman from a cosmetic standpoint.  Her next routine bilateral mammography and ultrasound will be due in September 2025 and she was given prescriptions.  I would like to continue with yearly MRIs in this patient due to the fact that she had this other incidental finding of DCIS in some of the excess tissue removed by plastic surgery and I would like to stagger the MRIs at 6-month intervals with the mammo and ultrasound.  Her next MRI should be performed around ??????? March 2025 and she was given a prescription.  She should continue to follow-up with Dr. Staples and remain on anastrozole.  She is a bit unhappy about the dogears on the sides of her chest wall and does not want go back to Dr. Lerman and I gave her Dr. Quijano and Dr. Goldberg's contact ??????information.  She was also given Arthur Sheridan's name for nipple tattooing since she has had some depigmentation of the left nipple.  She should continue to follow-up with Dr. Staples and remains on anastrozole.

## 2025-03-10 NOTE — DISCUSSION/SUMMARY
[FreeTextEntry1] : 58yo P1 w/ personal hx of ER/[place]+ breast ca dx'd 10/203 s/p surgery and RT. Currently on anastrazole. Pt with pelvic imaging revealing right pelvic mass and thickened EE with biopsy confirmed high grade serous carcinoma, now s/p robo tlh/bso, pelvic slnbx, omental biopsy, resection of right ovarian mass on 3/3/25, here for initial post op and found to be recovering well. Pathology and NCCN guidelines reviewed. -more than 50% of visit spent face to face with patient counseling and coordinating care -continue limited physical activity with no heavy lifting, nothing in the vagina and no submersion in water -reviewed pathology and plan for adjuvant therapy with VBT as per NCCN. all questions answered and patient expressed understanding -rad onc referral -rtc 3-4wks for cuff check -pain/fever/bleeding precautions given  d/w Dr Faulkner

## 2025-03-10 NOTE — HISTORY OF PRESENT ILLNESS
[FreeTextEntry1] : 58yo P1 w/ personal hx of ER/[place]+ breast ca dx'd 10/203 s/p surgery and RT. Currently on anastrazole. Pt with pelvic imaging revealing right pelvic mass and thickened EE with biopsy confirmed high grade serous carcinoma, now s/p robo tlh/bso, pelvic slnbx, omental biopsy, resection of right ovarian mass on 3/3/25.  Dx: stage 1A high grade serous carcinoma, no MI, no LVSI, MMRp Tx: robo tlh/bso, pelvic slnbx, omental biopsy, resection of right ovarian mass on 3/3/25. Adjtx: referral to rad onc ([facility])  Since procedure, patient reports doing well. She reports minimal pain and has resumed most normal activity with good tolerance. She has taken only tylenol/motrin. She reports normal appetite. She denies fever/bleeding/bloating. Reports normal urination. She has experienced some constipation which has been relieved with stool softener and prune juice.   Path results reviewed. Pathology 3/3/25:  FINAL PATHOLOGIC DIAGNOSIS CANCER PROTOCOL Specimen Procedure: Total hysterectomy and bilateral salpingo-oophorectomy Hysterectomy Type: Laparoscopic, robotic-assisted Specimen Integrity: Intact Tumor Tumor Site: Endometrium;  Endometrial polyp Tumor Size: Several small microscopic foci Histologic Type: Serous carcinoma Myometrial Invasion: Not identified Adenomyosis: Not identified Uterine Serosa Involvement: Not identified Lower Uterine Segment Involvement: Not identified Cervical Stromal Involvement: Not identified Other Tissue / Organ Involvement: Not identified Peritoneal / Ascitic Fluid: Malignant cells not identified Lymphovascular Invasion (LVI): Not identified Regional Lymph Nodes Regional Lymph Node Status: All regional lymph nodes negative for tumor cells Lymph Nodes Examined Total Number of Pelvic Nodes Examined: 3 Number of Pelvic Grandy Nodes Examined: 3 Total Number of Para-aortic Nodes Examined: 0 Pathologic Stage Classification (pTNM, AJCC 8th Edition) pT Category: pT1a Regional Lymph Nodes Modifier: (sn) pN Category: pN0 FIGO Stage FIGO Stage: IA Comment: The carcinoma is p53 and p16 positive with a high Ki-67, supporting the diagnosis . Normal mismatch repair (MMR) testing by immunohistochemistry Additional Findings Additional Findings: Endometrial polyp; Leiomyomas; Right ovarian endometrioma Best Tumor Blocks for Future Studies Tumor Block(s): D12  A. RIGHT FALLOPIAN TUBE AND OVARY MASS B.  RIGHT PELVIC SENTINEL LYMPH NODE C.  LEFT PELVIC SENTINEL LYMPH NODE D.  UTERUS, CERVIX, LEFT FALLOPIAN TUBE AND OVARY E.  PORTION OF OMENTUM  A.  RIGHT FALLOPIAN TUBE AND OVARIAN MASS: - Benign endometriotic cyst/endometrioma (16 cm) with associated reactive changes - Immunohistochemical stain results support the diagnosis Calretinin: Positive in mesothelial cells MOC31, PAX8: Positive in epithelial lining cells p53: Negative in epithelial lining cells p16: Negative for block-like staining in epithelial lining cells - Benign ovary with stromal hyperplasia - Benign fallopian tube  B.  RIGHT PELVIC SENTINEL LYMPH NODE: - One lymph node, negative for carcinoma on H E and cytokeratin stains  C.  LEFT PELVIC SENTINEL LYMPH NODE: - Two lymph nodes, negative for carcinoma on H E and cytokeratin stains  D.  UTERUS, CERVIX, LEFT FALLOPIAN TUBE AND OVARY (101 Grams): - Serous carcinoma, high grade, small microscopic foci partially involving the surface and superficial glands of an endometrial polyp and adjacent endometrium. - Negative for myometrial invasion - Normal mismatch repair (MMR) by immunohistochemistry (Intact nuclear staining for MLH1, MSH2, MSH6 and PMS2) - Immunohistochemical stain results support the diagnosis P53: Positive P16: Positive block-like staining ER: Only weak positive Ki-67: High - Remaining endometrium is inactive with cystic changes - Uterine leiomyomas, submucosal, intramural and subserosal, the largest 1.8 cm - Benign cervix with squamous metaplasia and cysts - Bilateral parametrial tissue, negative for carcinoma - Benign ovary with stromal hyperplasia - Benign fallopian tube  E.  PORTION OF OMENTUM: - Benign omental tissue  Patient's previous endometrial biopsy ([id number]; 2/14/25) High grade serous carcinoma involving endometrial polyp and seen as detached superficial fragments; Positive: p53, p16, PAX8, ER (weak), WV (weak), Ki-67 >50%    Intraoperative Diagnosis  A. Right fallopian tube and ovary mass, frozen section diagnosis: - Benign hemorrhagic cyst.  Enlarged ovary with stromal hyperplasia  Intraoperative Diagnosis         (Continued)  By Dr. Taylor to Dr. Giorgio Donovan at 12:04 PM  03/03/2025

## 2025-03-26 NOTE — REASON FOR VISIT
[Breast Cancer] : breast cancer [Consideration of Curative Therapy] : consideration of curative therapy for [Endometrial Cancer] : endometrial cancer

## 2025-04-07 NOTE — PHYSICAL EXAM
[de-identified] : s/p right partial mastectomy and SLN, s/p bilateral mastoplexy, mild persistent hyperpigmentation, good cosmetic outcome [Normal] : no joint swelling, no clubbing or cyanosis of the fingernails and muscle strength and tone were normal [de-identified] : deferred

## 2025-04-07 NOTE — REASON FOR VISIT
[Follow-Up: _____] : a [unfilled] follow-up visit [FreeTextEntry1] : The patient is a postmenopausal female with a family history of breast cancer who was seen by Dr. Fernandez back in 2021 and underwent a right breast 12:00 stereotactic core biopsy for calcifications which showed fibroadenomatoid change and benign calcifications.  She was found to have increasing calcifications in the right breast upper outer quadrant as well as a slight mammographic density on mammography from September 2023 and stereotactic core biopsies were performed in October 2023 showed high-grade DCIS which was ER/MA positive.  MRI showed enhancement over region of 4 x 3.5 cm but the clips were felt to be close together towards the upper outer aspect of the right breast and she underwent a right breast partial mastectomy with bracketed localization with sentinel lymph node biopsy and tissue rearrangement reduction mastopexy by plastic surgery on January 8, 2024.  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  There was surrounding DCIS but formal margins were all free and she had no lymphovascular invasion.  She was found to have a 1 cm focus of DCIS in some of the extra breast tissue removed during the mastopexy on the right side but this did not extend any margins.  This was a pathologic prognostic stage IA breast cancer.  The left reduction mastopexy tissue just showed an atypical intraductal papilloma which was benign.  She was presented at tumor board and it was felt that we could just proceed with radiation without further surgery and she underwent external beam radiation with Dr. Collado which finished in March 2024.  She was seen by Dr. Staples and Oncotype recurrence score was 18 and anastrozole was recommended.  She comes in now for routine follow-up. [FreeTextEntry2] : January 8, 2024

## 2025-04-07 NOTE — DISEASE MANAGEMENT
[Clinical] : TNM Stage: c [IA] : IA [FreeTextEntry4] : Invasive Ductal Carcinoma of the Right Breast, ER/[place]+ [Pathological] : TNM Stage: p [TTNM] : 1a [NTNM] : 0 [MTNM] : X [I] : I

## 2025-04-07 NOTE — ASSESSMENT
[FreeTextEntry1] : The patient is a 59-year-old G3, P1 postmenopausal woman.  She underwent menarche at age 11.  She has a family history with her paternal aunt with breast cancer at age 50.  She was found to have some suspicious calcifications seen on mammography from [facility] radiology in [facility] on October 13, 2021 and was seen by Dr. Fernandez at that time and underwent right breast 12:00 stereotactic core biopsy on November 17, 2021 just showing fibroadenomatoid change and benign calcifications.  Follow-up mammographies in 2022 showed likely vascular or milk of calcium calcifications.  Another bilateral mammography performed on September 11, 2023 showed increasing calcifications again in the upper outer aspect of the right breast as well as a partially circumscribed mass in the upper outer aspect of the right breast and stereotactic core biopsies were performed.  The upper outer quadrant mass with calcifications showed intermediate to high-grade DCIS which was ER/IN positive (ER-95%, IN-50%) and she had an "hourglass" clip placed at that biopsy site and the upper outer quadrant calcifications showed intermediate grade DCIS again ER/IN positive (ER-95%, IN-90%) with a "stoplight" clip placed at that biopsy site.  She underwent [facility] genetic panel testing in November 2023 which was negative. MRI was performed on December 7, 2023 showing the area of the 2 clips with this localized upper outer quadrant region of non-mass like enhancement over an area of 4 x 3.5 x 3.5 cm. I did review her postbiopsy mammographic imaging as well as her recent MRI and these 2 clips were fairly close together and she was felt to be a good candidate for partial mastectomy with bracketed localizations and she was seen by Dr. Lerman preoperatively and felt to be a good candidate for reduction mastopexy at the time of surgery.  Witten lymph node biopsy was also recommended given the extent of the resection being performed.  She underwent a right breast partial mastectomy with bracketed localizations and sentinel lymph node biopsy with tissue rearrangement reduction mastopexy performed by Dr. Lerman on January 8, 2024.  The final pathology showed 2 negative right axillary sentinel lymph nodes and 1 negative right axillary nonsentinel lymph node and the wide excision did show a 1.3 cm high-grade micropapillary type invasive duct cancer but no lymphovascular invasion.  The invasive cancer extended to the superior medial margin however all final margins were negative.  She did have extensive DCIS measuring 3.5 cm which was high-grade with close margins on the initial partial mastectomy but again all final margins were negative.  The invasive cancer was ER positive greater than 95%, IN positive at 70%, HER2 2+ equivocal but negative by FISH with a Ki-67 of 30%.  The excess right breast tissue removed by plastic surgery did have 1 focal area with DCIS measuring 1 cm but margins were all negative.  This cancer was a pathologic prognostic stage IA T1c N0 M0 breast cancer. It was impossible to tell exactly where that separate area of DCIS originated from.  She did have an atypical intraductal papilloma seen in the excess breast tissue on the left.   The patient has been unhappy about her breast size which she thinks is too small but a significant amount of tissue had to be removed from her right breast upper outer quadrant in order to clear her margins. I did place fiducials at the site of the right breast upper outer quadrant partial mastectomy at the time of surgery for the radiation oncologist.  I did present her case in tumor board and there was agreement that we could just move forward with external beam radiation to the right breast and mastectomy was not required given the fact that this other incidental area of DCIS was focal and not extending to any of the margin of the excess tissue excised.  The invasive tumor had an Oncotype recurrence score of 18 and she was seen by Dr. Staples and was placed on anastrozole after radiation.  She was seen by Dr. Collado for radiation oncology evaluation and underwent hypofractionated radiation to the right breast which finished in March 2024.  The patient understoof the benefits for endocrine therapy and was placed on anastrozole by Dr. Staples.  She underwent her last bilateral mammography and ultrasound which was reviewed from September 26, 2024 performed at [facility] which showed bilateral postsurgical changes with a seroma measuring 2 cm seen in the left breast.  She is performing yearly MRIs since she did have this incidental separate finding of DCIS in the reduction mastopexy tissue removed by plastic surgery and her last MRI was reviewed from February 24, 2025 performed at an [facility] radiology facility in [place], [place] which showed no suspicious findings.  On exam, the patient had some fat necrosis felt on the lateral aspect of the left chest wall which was related to her reduction mastopexy and was aspirated back in 2024 with resolution of the mass.  She has done well from her right breast radiation and does have some radiation changes to the skin but no evidence of recurrence.  Her free nipple grafts have healed well.  She was reassured and I would like to see her again in another 6 months for routine follow-up.  She should follow-up with Dr. Lerman from a cosmetic standpoint.  Her next routine bilateral mammography and ultrasound will be due in September 2025 and she was given prescriptions.  I would like to continue with yearly MRIs in this patient, due to the separate incidental finding of DCIS, which will be due again in February 2026 and she was given a prescription.  She is a bit unhappy about the dogears on the sides of her chest wall and does not want go back to Dr. Lerman and I gave her Dr. Quijano and Dr. Goldberg's contact information.  She was also given Arthur Sheridan's name for nipple tattooing since she has had some depigmentation of the left nipple.  She should continue to follow-up with Dr. Staples and remains on anastrozole.  Of note, the patient was diagnosed with an endometrial cancer and underwent a RICK/BSO by Dr. Giorgio Donovan in March 2025.  She has an appointment to see Dr. Collado and tells me she is being recommended to undergo radiation therapy.

## 2025-04-07 NOTE — DISCUSSION/SUMMARY
[FreeTextEntry1] : 60yo P1 w/ personal hx of ER/RI+ breast ca dx'd 10/203 s/p surgery and RT. Currently on anastrazole. Pt with pelvic imaging revealing right pelvic mass and thickened EE with biopsy confirmed high grade serous carcinoma, now s/p robo tlh/bso, pelvic slnbx, omental biopsy, resection of right ovarian mass on 3/3/25, here for cuff check and found to be recovering well. Pathology and NCCN guidelines reviewed. -more than 50% of visit spent face to face with patient counseling and coordinating care  -reviewed pathology and plan for adjuvant therapy with VBT as per NCCN. all questions answered and patient expressed understanding   -pain/fever/bleeding precautions given

## 2025-04-07 NOTE — PHYSICAL EXAM
[de-identified] : s/p right partial mastectomy and SLN, s/p bilateral mastoplexy, mild persistent hyperpigmentation, good cosmetic outcome [Normal] : no joint swelling, no clubbing or cyanosis of the fingernails and muscle strength and tone were normal [de-identified] : deferred

## 2025-04-07 NOTE — DISEASE MANAGEMENT
[Clinical] : TNM Stage: c [IA] : IA [FreeTextEntry4] : Invasive Ductal Carcinoma of the Right Breast, ER/[place]+ [Pathological] : TNM Stage: p [TTNM] : 1a [NTNM] : 0 [MTNM] : X [I] : I

## 2025-04-07 NOTE — DISCUSSION/SUMMARY
[FreeTextEntry1] : 58yo P1 w/ personal hx of ER/[place]+ breast ca dx'd 10/203 s/p surgery and RT. Currently on anastrazole. Pt with pelvic imaging revealing right pelvic mass and thickened EE with biopsy confirmed high grade serous carcinoma, now s/p robo tlh/bso, pelvic slnbx, omental biopsy, resection of right ovarian mass on 3/3/25, here for cuff check and found to be recovering well. Pathology and NCCN guidelines reviewed. -more than 50% of visit spent face to face with patient counseling and coordinating care  -reviewed pathology and plan for adjuvant therapy with VBT as per NCCN. all questions answered and patient expressed understanding   -pain/fever/bleeding precautions given

## 2025-04-07 NOTE — DISEASE MANAGEMENT
[Clinical] : TNM Stage: c [IA] : IA [FreeTextEntry4] : Invasive Ductal Carcinoma of the Right Breast, ER/[place]+ [Pathological] : TNM Stage: p [TTNM] : 1a [NTNM] : 0 [MTNM] : X [I] : I

## 2025-04-07 NOTE — HISTORY OF PRESENT ILLNESS
[FreeTextEntry1] : Mrs. Grullon is a 59-year-old female, she was diagnosed with Stage I ER/[place]+ invasive ductal carcinoma of the right breast status post right breast partial mastectomy with sentinel lymph node biopsy and tissue rearrangement reduction mastopexy, status post adjuvant radiation therapy to the right breast.in 10/2024. She is now presenting with a newly diagnosed serous endometrial carcinoma.  9/13/24 - PAP negative   Pelvic imaging revealed a right pelvic mass, fibroid uterus, thickened EE, and labs with elevated ca125 and cea. Endometrial biopsy was performed 9/27/24 and revealed strips of atrophic endometrial epithelium.  CT c/a/p 10/4/25: revealed a 15/1cm lobulated low density mass in the anterior pelvis, abutting and inseparable from the right ovary.   10/21/2024 MRI Pelvis - Lobulated T1 hyperintense structure measuring up to 13.8 cm extending from the right lower quadrant into the pelvis with differential considerations including right proteinaceous paraovarian cyst versus peritoneal inclusion cyst - Uterine fibroids measuring up to 2.1 cm including a subcentimeter submucosal/intracavitary fibroid - 0.6 cm heterogeneous structure within the fundal endometrial canal possibly representing additional submucosal fibroid versus polyp    Ms. Grullon was evaluated by Dr. Faulkner and endometrial biopsy was performed on 2/14/25 that revealed: FINAL PATHOLOGIC DIAGNOSIS ENDOMETRIAL BIOPSY: 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  On 2/25/25 CT chest was performed and revealed a somewhat poorly defined 7mm and smaller subsolid nodular densities bilaterally with upper lung zone predilection are nonspecific and most likely inflammatory or post-inflammatory. There was a bandlike left basilar opacity with somewhat nodular appearing component measuring 9x8mm is nonspecific and may be atelectasis or fibrosis.   2/25/25 - CT A/P revealed a stable lobulated cystic structure in the pelvis measuring 12.5cm. Possible paraovarian cyst or peritoneal inclusion cyst. No lymphadenopathy. The was fat containing umbilical hernia and a left lower lobe nodular opacity measuring 0.8cm that appeared somewhat focal, and an area of mild atelectasis.   On 3/3/25, Dr. Giorgio Donovan performed a robotic tlh/bso/slnbx/omx/pelvic mass resection/possible laparotomy Final path demonstrated a stage 1A high grade serous carcinoma. pelvic washings were negative. The disease was confined to a polyp, there was no myometrial invasion, no LVI, SLNs negative, MMRp. All regional lymph nodes were positive for tumor cells (0/3). FIGO Stage IA pT1a pN0. The carcinoma was p53 and p16 positive with a high Ki-67.   Ms. Grullon presents  today to discuss the potential benefits of adjuvant therapy with vaginal brachytherapy.  She tolerated the surgery well but is concerned about her second cancer diagnosis is such a short time span.

## 2025-04-07 NOTE — HISTORY OF PRESENT ILLNESS
[FreeTextEntry1] : Mrs. Grullon is a 59-year-old female, she was diagnosed with Stage I ER/CA+ invasive ductal carcinoma of the right breast status post right breast partial mastectomy with sentinel lymph node biopsy and tissue rearrangement reduction mastopexy, status post adjuvant radiation therapy to the right breast.in 10/2024. She is now presenting with a newly diagnosed serous endometrial carcinoma.  9/13/24 - PAP negative   Pelvic imaging revealed a right pelvic mass, fibroid uterus, thickened EE, and labs with elevated ca125 and cea. Endometrial biopsy was performed 9/27/24 and revealed strips of atrophic endometrial epithelium.  CT c/a/p 10/4/25: revealed a 15/1cm lobulated low density mass in the anterior pelvis, abutting and inseparable from the right ovary.   10/21/2024 MRI Pelvis - Lobulated T1 hyperintense structure measuring up to 13.8 cm extending from the right lower quadrant into the pelvis with differential considerations including right proteinaceous paraovarian cyst versus peritoneal inclusion cyst - Uterine fibroids measuring up to 2.1 cm including a subcentimeter submucosal/intracavitary fibroid - 0.6 cm heterogeneous structure within the fundal endometrial canal possibly representing additional submucosal fibroid versus polyp    Ms. Grullon was evaluated by Dr. Faulkner and endometrial biopsy was performed on 2/14/25 that revealed: FINAL PATHOLOGIC DIAGNOSIS ENDOMETRIAL BIOPSY: 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  On 2/25/25 CT chest was performed and revealed a somewhat poorly defined 7mm and smaller subsolid nodular densities bilaterally with upper lung zone predilection are nonspecific and most likely inflammatory or post-inflammatory. There was a bandlike left basilar opacity with somewhat nodular appearing component measuring 9x8mm is nonspecific and may be atelectasis or fibrosis.   2/25/25 - CT A/P revealed a stable lobulated cystic structure in the pelvis measuring 12.5cm. Possible paraovarian cyst or peritoneal inclusion cyst. No lymphadenopathy. The was fat containing umbilical hernia and a left lower lobe nodular opacity measuring 0.8cm that appeared somewhat focal, and an area of mild atelectasis.   On 3/3/25, Dr. Giorgio Donovan performed a robotic tlh/bso/slnbx/omx/pelvic mass resection/possible laparotomy Final path demonstrated a stage 1A high grade serous carcinoma. pelvic washings were negative. The disease was confined to a polyp, there was no myometrial invasion, no LVI, SLNs negative, MMRp. All regional lymph nodes were positive for tumor cells (0/3). FIGO Stage IA pT1a pN0. The carcinoma was p53 and p16 positive with a high Ki-67.   Ms. Grullon presents  today to discuss the potential benefits of adjuvant therapy with vaginal brachytherapy.  She tolerated the surgery well but is concerned about her second cancer diagnosis is such a short time span.

## 2025-04-07 NOTE — REASON FOR VISIT
[Follow-Up: _____] : a [unfilled] follow-up visit [FreeTextEntry1] : The patient is a postmenopausal female with a family history of breast cancer who was seen by Dr. Fernandez back in 2021 and underwent a right breast 12:00 stereotactic core biopsy for calcifications which showed fibroadenomatoid change and benign calcifications.  She was found to have increasing calcifications in the right breast upper outer quadrant as well as a slight mammographic density on mammography from September 2023 and stereotactic core biopsies were performed in October 2023 showed high-grade DCIS which was ER/UT positive.  MRI showed enhancement over region of 4 x 3.5 cm but the clips were felt to be close together towards the upper outer aspect of the right breast and she underwent a right breast partial mastectomy with bracketed localization with sentinel lymph node biopsy and tissue rearrangement reduction mastopexy by plastic surgery on January 8, 2024.  The final pathology showed 3 negative right axillary lymph nodes and she ended up with a 1.3 cm high-grade micropapillary invasive duct cancer within the right breast partial mastectomy which was ER/UT positive HER2/christie negative by FISH with a Ki-67 of 30%.  There was surrounding DCIS but formal margins were all free and she had no lymphovascular invasion.  She was found to have a 1 cm focus of DCIS in some of the extra breast tissue removed during the mastopexy on the right side but this did not extend any margins.  This was a pathologic prognostic stage IA breast cancer.  The left reduction mastopexy tissue just showed an atypical intraductal papilloma which was benign.  She was presented at tumor board and it was felt that we could just proceed with radiation without further surgery and she underwent external beam radiation with Dr. Collado which finished in March 2024.  She was seen by Dr. Staples and Oncotype recurrence score was 18 and anastrozole was recommended.  She comes in now for routine follow-up. [FreeTextEntry2] : January 8, 2024

## 2025-04-07 NOTE — ASSESSMENT
[FreeTextEntry1] : The patient is a 59-year-old G3, P1 postmenopausal woman.  She underwent menarche at age 11.  She has a family history with her paternal aunt with breast cancer at age 50.  She was found to have some suspicious calcifications seen on mammography from [facility] radiology in [facility] on October 13, 2021 and was seen by Dr. Fernandez at that time and underwent right breast 12:00 stereotactic core biopsy on November 17, 2021 just showing fibroadenomatoid change and benign calcifications.  Follow-up mammographies in 2022 showed likely vascular or milk of calcium calcifications.  Another bilateral mammography performed on September 11, 2023 showed increasing calcifications again in the upper outer aspect of the right breast as well as a partially circumscribed mass in the upper outer aspect of the right breast and stereotactic core biopsies were performed.  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  She underwent [facility] genetic panel testing in November 2023 which was negative. MRI was performed on December 7, 2023 showing the area of the 2 clips with this localized upper outer quadrant region of non-mass like enhancement over an area of 4 x 3.5 x 3.5 cm. I did review her postbiopsy mammographic imaging as well as her recent MRI and these 2 clips were fairly close together and she was felt to be a good candidate for partial mastectomy with bracketed localizations and she was seen by Dr. Lerman preoperatively and felt to be a good candidate for reduction mastopexy at the time of surgery.  Percival lymph node biopsy was also recommended given the extent of the resection being performed.  She underwent a right breast partial mastectomy with bracketed localizations and sentinel lymph node biopsy with tissue rearrangement reduction mastopexy performed by Dr. Lerman on January 8, 2024.  The final pathology showed 2 negative right axillary sentinel lymph nodes and 1 negative right axillary nonsentinel lymph node and the wide excision did show a 1.3 cm high-grade micropapillary type invasive duct cancer but no lymphovascular invasion.  The invasive cancer extended to the superior medial margin however all final margins were negative.  She did have extensive DCIS measuring 3.5 cm which was high-grade with close margins on the initial partial mastectomy but again all final margins were negative.  The invasive cancer was ER positive greater than 95%, DE positive at 70%, HER2 2+ equivocal but negative by FISH with a Ki-67 of 30%.  The excess right breast tissue removed by plastic surgery did have 1 focal area with DCIS measuring 1 cm but margins were all negative.  This cancer was a pathologic prognostic stage IA T1c N0 M0 breast cancer. It was impossible to tell exactly where that separate area of DCIS originated from.  She did have an atypical intraductal papilloma seen in the excess breast tissue on the left.   The patient has been unhappy about her breast size which she thinks is too small but a significant amount of tissue had to be removed from her right breast upper outer quadrant in order to clear her margins. I did place fiducials at the site of the right breast upper outer quadrant partial mastectomy at the time of surgery for the radiation oncologist.  I did present her case in tumor board and there was agreement that we could just move forward with external beam radiation to the right breast and mastectomy was not required given the fact that this other incidental area of DCIS was focal and not extending to any of the margin of the excess tissue excised.  The invasive tumor had an Oncotype recurrence score of 18 and she was seen by Dr. Staples and was placed on anastrozole after radiation.  She was seen by Dr. Collado for radiation oncology evaluation and underwent hypofractionated radiation to the right breast which finished in March 2024.  The patient understoof the benefits for endocrine therapy and was placed on anastrozole by Dr. Staples.  She underwent her last bilateral mammography and ultrasound which was reviewed from September 26, 2024 performed at [facility] which showed bilateral postsurgical changes with a seroma measuring 2 cm seen in the left breast.  She is performing yearly MRIs since she did have this incidental separate finding of DCIS in the reduction mastopexy tissue removed by plastic surgery and her last MRI was reviewed from February 24, 2025 performed at an [facility] radiology facility in [place], [place] which showed no suspicious findings.  On exam, the patient had some fat necrosis felt on the lateral aspect of the left chest wall which was related to her reduction mastopexy and was aspirated back in 2024 with resolution of the mass.  She has done well from her right breast radiation and does have some radiation changes to the skin but no evidence of recurrence.  Her free nipple grafts have healed well.  She was reassured and I would like to see her again in another 6 months for routine follow-up.  She should follow-up with Dr. Lerman from a cosmetic standpoint.  Her next routine bilateral mammography and ultrasound will be due in September 2025 and she was given prescriptions.  I would like to continue with yearly MRIs in this patient, due to the separate incidental finding of DCIS, which will be due again in February 2026 and she was given a prescription.  She is a bit unhappy about the dogears on the sides of her chest wall and does not want go back to Dr. Lerman and I gave her Dr. Quijano and Dr. Goldberg's contact information.  She was also given Arthur Sheridan's name for nipple tattooing since she has had some depigmentation of the left nipple.  She should continue to follow-up with Dr. Staples and remains on anastrozole.  Of note, the patient was diagnosed with an endometrial cancer and underwent a RICK/BSO by Dr. Giorgio Donovan in March 2025.  She has an appointment to see Dr. Collado and tells me she is being recommended to undergo radiation therapy.

## 2025-04-07 NOTE — HISTORY OF PRESENT ILLNESS
[FreeTextEntry1] : 58yo P1 w/ personal hx of ER/IN+ breast ca dx'd 10/203 s/p surgery and RT. Currently on anastrazole. Pt with pelvic imaging revealing right pelvic mass and thickened EE with biopsy confirmed high grade serous carcinoma, now s/p robo tlh/bso, pelvic slnbx, omental biopsy, resection of right ovarian mass on 3/3/25.  Dx: stage 1A high grade serous carcinoma, no MI, no LVSI, MMRp Tx: robo tlh/bso, pelvic slnbx, omental biopsy, resection of right ovarian mass on 3/3/25. Adjtx: 3/31/25 rad onc consult ([facility])  Since last visit, patient reports doing well. She reports minimal pain and has resumed most normal activity with good tolerance. She no longer requires pain meds. She reports normal appetite. She denies fever/bleeding/bloating. Reports normal urination. She has experienced some constipation which has been relieved with stool softener and prune juice. She has appointment with rad onc on Weds and has already scheduled her VBT appointments. We briefly reviewed vaginal dilator use following radiation and vaginal moisturizers. She discussed feeling somewhat overwhelmed with her diagnosis of endometrial cancer following her breast cancer diagnosis, and we discussed reaching out to Wellness program for support, pt agrees to inquire about services. She has good support at home with her daughter.   Path results reviewed. Pathology 3/3/25:  FINAL PATHOLOGIC DIAGNOSIS CANCER PROTOCOL Specimen Procedure: Total hysterectomy and bilateral salpingo-oophorectomy Hysterectomy Type: Laparoscopic, robotic-assisted Specimen Integrity: Intact Tumor Tumor Site: Endometrium;  Endometrial polyp Tumor Size: Several small microscopic foci Histologic Type: Serous carcinoma Myometrial Invasion: Not identified Adenomyosis: Not identified Uterine Serosa Involvement: Not identified Lower Uterine Segment Involvement: Not identified Cervical Stromal Involvement: Not identified Other Tissue / Organ Involvement: Not identified Peritoneal / Ascitic Fluid: Malignant cells not identified Lymphovascular Invasion (LVI): Not identified Regional Lymph Nodes Regional Lymph Node Status: All regional lymph nodes negative for tumor cells Lymph Nodes Examined Total Number of Pelvic Nodes Examined: 3 Number of Pelvic [place] Nodes Examined: 3 Total Number of Para-aortic Nodes Examined: 0 Pathologic Stage Classification (pTNM, AJCC 8th Edition) pT Category: pT1a Regional Lymph Nodes Modifier: (sn) pN Category: pN0 FIGO Stage FIGO Stage: IA Comment: The carcinoma is p53 and p16 positive with a high Ki-67, supporting the diagnosis . Normal mismatch repair (MMR) testing by immunohistochemistry Additional Findings Additional Findings: Endometrial polyp; Leiomyomas; Right ovarian endometrioma Best Tumor Blocks for Future Studies Tumor Block(s): D12  A. RIGHT FALLOPIAN TUBE AND OVARY MASS B.  RIGHT PELVIC SENTINEL LYMPH NODE C.  LEFT PELVIC SENTINEL LYMPH NODE D.  UTERUS, CERVIX, LEFT FALLOPIAN TUBE AND OVARY E.  PORTION OF OMENTUM  A.  RIGHT FALLOPIAN TUBE AND OVARIAN MASS: - Benign endometriotic cyst/endometrioma (16 cm) with associated reactive changes - Immunohistochemical stain results support the diagnosis Calretinin: Positive in mesothelial cells MOC31, PAX8: Positive in epithelial lining cells p53: Negative in epithelial lining cells p16: Negative for block-like staining in epithelial lining cells - Benign ovary with stromal hyperplasia - Benign fallopian tube B.  RIGHT PELVIC SENTINEL LYMPH NODE: - One lymph node, negative for carcinoma on H E and cytokeratin stains C.  LEFT PELVIC SENTINEL LYMPH NODE: - Two lymph nodes, negative for carcinoma on H E and cytokeratin stains D.  UTERUS, CERVIX, LEFT FALLOPIAN TUBE AND OVARY ([address]): - Serous carcinoma, high grade, small microscopic foci partially involving the surface and superficial glands of an endometrial polyp and adjacent endometrium. - Negative for myometrial invasion - Normal mismatch repair (MMR) by immunohistochemistry (Intact nuclear staining for MLH1, MSH2, MSH6 and PMS2) - Immunohistochemical stain results support the diagnosis P53: Positive P16: Positive block-like staining ER: Only weak positive Ki-67: High - Remaining endometrium is inactive with cystic changes - Uterine leiomyomas, submucosal, intramural and subserosal, the largest 1.8 cm - Benign cervix with squamous metaplasia and cysts - Bilateral parametrial tissue, negative for carcinoma - Benign ovary with stromal hyperplasia - Benign fallopian tube E.  PORTION OF OMENTUM: - Benign omental tissue  Patient's previous endometrial biopsy ([id number]; 2/14/25) High grade serous carcinoma involving endometrial polyp and seen as detached superficial fragments; Positive: p53, p16, PAX8, ER (weak), IN (weak), Ki-67 >50%  Intraoperative Diagnosis  A. Right fallopian tube and ovary mass, frozen section diagnosis: - Benign hemorrhagic cyst.  Enlarged ovary with stromal hyperplasia  Intraoperative Diagnosis         (Continued)  By Dr. Taylor to Dr. Giorgio Donovan at 12:04 PM  03/03/2025

## 2025-04-07 NOTE — DISCUSSION/SUMMARY
[FreeTextEntry1] : 60yo P1 w/ personal hx of ER/LA+ breast ca dx'd 10/203 s/p surgery and RT. Currently on anastrazole. Pt with pelvic imaging revealing right pelvic mass and thickened EE with biopsy confirmed high grade serous carcinoma, now s/p robo tlh/bso, pelvic slnbx, omental biopsy, resection of right ovarian mass on 3/3/25, here for cuff check and found to be recovering well. Pathology and NCCN guidelines reviewed. -more than 50% of visit spent face to face with patient counseling and coordinating care  -reviewed pathology and plan for adjuvant therapy with VBT as per NCCN. all questions answered and patient expressed understanding   -pain/fever/bleeding precautions given

## 2025-04-07 NOTE — VITALS
[Maximal Pain Intensity: 0/10] : 0/10 [Maximal Pain Intensity: 2/10] : 2/10 [Least Pain Intensity: 0/10] : 0/10 [90: Able to carry normal activity; minor signs or symptoms of disease.] : 90: Able to carry normal activity; minor signs or symptoms of disease.  [Date: ____________] : Patient's last distress assessment performed on [unfilled]. [3 - Distress Level] : Distress Level: 3

## 2025-04-07 NOTE — DISEASE MANAGEMENT
[Clinical] : TNM Stage: c [IA] : IA [FreeTextEntry4] : Invasive Ductal Carcinoma of the Right Breast, ER/RI+ [Pathological] : TNM Stage: p [TTNM] : 1a [NTNM] : 0 [MTNM] : X [I] : I

## 2025-04-07 NOTE — LETTER CLOSING
[Consult Closing:] : Thank you for allowing me to participate in the care of this patient.  If you have any questions, please do not hesitate to contact me. [Sincerely yours,] : Sincerely yours, [FreeTextEntry3] : Patti Collado MD

## 2025-04-07 NOTE — HISTORY OF PRESENT ILLNESS
[FreeTextEntry1] : Mrs. Grullon is a 59-year-old female, she was diagnosed with Stage I ER/WV+ invasive ductal carcinoma of the right breast status post right breast partial mastectomy with sentinel lymph node biopsy and tissue rearrangement reduction mastopexy, status post adjuvant radiation therapy to the right breast.in 10/2024. She is now presenting with a newly diagnosed serous endometrial carcinoma.  9/13/24 - PAP negative   Pelvic imaging revealed a right pelvic mass, fibroid uterus, thickened EE, and labs with elevated ca125 and cea. Endometrial biopsy was performed 9/27/24 and revealed strips of atrophic endometrial epithelium.  CT c/a/p 10/4/25: revealed a 15/1cm lobulated low density mass in the anterior pelvis, abutting and inseparable from the right ovary.   10/21/2024 MRI Pelvis - Lobulated T1 hyperintense structure measuring up to 13.8 cm extending from the right lower quadrant into the pelvis with differential considerations including right proteinaceous paraovarian cyst versus peritoneal inclusion cyst - Uterine fibroids measuring up to 2.1 cm including a subcentimeter submucosal/intracavitary fibroid - 0.6 cm heterogeneous structure within the fundal endometrial canal possibly representing additional submucosal fibroid versus polyp    Ms. Grullon was evaluated by Dr. Faulkner and endometrial biopsy was performed on 2/14/25 that revealed: FINAL PATHOLOGIC DIAGNOSIS ENDOMETRIAL BIOPSY: 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  On 2/25/25 CT chest was performed and revealed a somewhat poorly defined 7mm and smaller subsolid nodular densities bilaterally with upper lung zone predilection are nonspecific and most likely inflammatory or post-inflammatory. There was a bandlike left basilar opacity with somewhat nodular appearing component measuring 9x8mm is nonspecific and may be atelectasis or fibrosis.   2/25/25 - CT A/P revealed a stable lobulated cystic structure in the pelvis measuring 12.5cm. Possible paraovarian cyst or peritoneal inclusion cyst. No lymphadenopathy. The was fat containing umbilical hernia and a left lower lobe nodular opacity measuring 0.8cm that appeared somewhat focal, and an area of mild atelectasis.   On 3/3/25, Dr. Giorgio Donovan performed a robotic tlh/bso/slnbx/omx/pelvic mass resection/possible laparotomy Final path demonstrated a stage 1A high grade serous carcinoma. pelvic washings were negative. The disease was confined to a polyp, there was no myometrial invasion, no LVI, SLNs negative, MMRp. All regional lymph nodes were positive for tumor cells (0/3). FIGO Stage IA pT1a pN0. The carcinoma was p53 and p16 positive with a high Ki-67.   Ms. Grullon presents  today to discuss the potential benefits of adjuvant therapy with vaginal brachytherapy.  She tolerated the surgery well but is concerned about her second cancer diagnosis is such a short time span.

## 2025-04-07 NOTE — PHYSICAL EXAM
[No Supraclavicular Adenopathy] : no supraclavicular adenopathy [No Cervical Adenopathy] : no cervical adenopathy [Clear to Auscultation Bilat] : clear to auscultation bilaterally [Examined in the supine and seated position] : examined in the supine and seated position [Symmetrical] : symmetrical [No dominant masses] : no dominant masses in right breast  [No dominant masses] : no dominant masses left breast [No Nipple Retraction] : no left nipple retraction [No Nipple Discharge] : no left nipple discharge [Breast Mass Right Breast ___cm] : no masses [Breast Mass Left Breast ___cm] : no masses [No Axillary Lymphadenopathy] : no left axillary lymphadenopathy [No Edema] : no edema [No Rashes] : no rashes [Breast Nipple Inversion] : nipples not inverted [Breast Nipple Retraction] : nipples not retracted [Breast Nipple Flattening] : nipples not flattened [Breast Nipple Fissures] : nipples not fissured [Breast Abnormal Lactation (Galactorrhea)] : no galactorrhea [Breast Abnormal Secretion Bloody Fluid] : no bloody discharge [Breast Abnormal Secretion Serous Fluid] : no serous discharge [Breast Abnormal Secretion Opalescent Fluid] : no milky discharge [de-identified] : On exam, the patient has done well from her right breast partial mastectomy with bracketed localizations and sentinel lymph node biopsy with tissue rearrangement reduction mastopexy by plastics performed in January 2024.  She had free nipple skin grafts performed and then underwent external beam radiation which finished in March 2024.  She has no evidence of recurrence in the right breast and no suspicious findings in the left breast.  She has no axillary, supraclavicular, or cervical adenopathy. [de-identified] : Status post partial mastectomy with bracketed localization and sentinel lymph node biopsy with tissue rearrangement reduction mastopexy by plastics with free nipple skin graft and then radiation with no evidence of recurrence.   [de-identified] : Status post reduction mastopexy for symmetry with free nipple skin graft with no suspicious findings.  The prior region of fat necrosis on the lateral aspect which was aspirated in 2024 has resolved. [de-identified] : Free nipple skin grafts with some mild epidermolysis.

## 2025-04-07 NOTE — PHYSICAL EXAM
[de-identified] : s/p right partial mastectomy and SLN, s/p bilateral mastoplexy, mild persistent hyperpigmentation, good cosmetic outcome [Normal] : no joint swelling, no clubbing or cyanosis of the fingernails and muscle strength and tone were normal [de-identified] : deferred

## 2025-04-07 NOTE — PHYSICAL EXAM
[No Supraclavicular Adenopathy] : no supraclavicular adenopathy [No Cervical Adenopathy] : no cervical adenopathy [Clear to Auscultation Bilat] : clear to auscultation bilaterally [Examined in the supine and seated position] : examined in the supine and seated position [Symmetrical] : symmetrical [No dominant masses] : no dominant masses in right breast  [No dominant masses] : no dominant masses left breast [No Nipple Retraction] : no left nipple retraction [No Nipple Discharge] : no left nipple discharge [Breast Mass Right Breast ___cm] : no masses [Breast Mass Left Breast ___cm] : no masses [No Axillary Lymphadenopathy] : no left axillary lymphadenopathy [No Edema] : no edema [No Rashes] : no rashes [Breast Nipple Inversion] : nipples not inverted [Breast Nipple Retraction] : nipples not retracted [Breast Nipple Flattening] : nipples not flattened [Breast Nipple Fissures] : nipples not fissured [Breast Abnormal Lactation (Galactorrhea)] : no galactorrhea [Breast Abnormal Secretion Bloody Fluid] : no bloody discharge [Breast Abnormal Secretion Serous Fluid] : no serous discharge [Breast Abnormal Secretion Opalescent Fluid] : no milky discharge [de-identified] : On exam, the patient has done well from her right breast partial mastectomy with bracketed localizations and sentinel lymph node biopsy with tissue rearrangement reduction mastopexy by plastics performed in January 2024.  She had free nipple skin grafts performed and then underwent external beam radiation which finished in March 2024.  She has no evidence of recurrence in the right breast and no suspicious findings in the left breast.  She has no axillary, supraclavicular, or cervical adenopathy. [de-identified] : Status post partial mastectomy with bracketed localization and sentinel lymph node biopsy with tissue rearrangement reduction mastopexy by plastics with free nipple skin graft and then radiation with no evidence of recurrence.   [de-identified] : Status post reduction mastopexy for symmetry with free nipple skin graft with no suspicious findings.  The prior region of fat necrosis on the lateral aspect which was aspirated in 2024 has resolved. [de-identified] : Free nipple skin grafts with some mild epidermolysis.

## 2025-04-07 NOTE — PHYSICAL EXAM
within functional limits [de-identified] : s/p right partial mastectomy and SLN, s/p bilateral mastoplexy, mild persistent hyperpigmentation, good cosmetic outcome [Normal] : no joint swelling, no clubbing or cyanosis of the fingernails and muscle strength and tone were normal [de-identified] : deferred

## 2025-04-07 NOTE — HISTORY OF PRESENT ILLNESS
[FreeTextEntry1] : The patient is a 59-year-old G3, P1 postmenopausal woman.  She underwent menarche at age 11.  She has a family history with her paternal aunt with breast cancer at age 50.  She was found to have some suspicious calcifications seen on mammography from [facility] radiology in [facility] on October 13, 2021 and was seen by Dr. Fernandez at that time and underwent right breast 12:00 stereotactic core biopsy on November 17, 2021 just showing fibroadenomatoid change and benign calcifications.  Follow-up mammographies in 2022 showed likely vascular or milk of calcium calcifications.  A more recent bilateral mammography performed on September 11, 2023 showed increasing calcifications again in the upper outer aspect of the right breast as well as a partially circumscribed mass in the upper outer aspect of the right breast and stereotactic core biopsies were performed.  The upper outer quadrant mass with calcifications showed intermediate to high-grade DCIS which was ER/VA positive and she had an "hourglass" clip placed at that biopsy site and the upper outer quadrant calcifications showed intermediate grade DCIS again ER/VA positive with a "stoplight" clip placed at that biopsy site.  She underwent [facility] genetic panel testing in November 2023 which was negative.  MRI was performed on December 7, 2023 showing the area of the 2 clips with this localized upper outer quadrant region of non-mass like enhancement over an area of 4 x 3.5 x 3.5 cm.  I did review her postbiopsy mammographic imaging as well as her recent MRI and these 2 clips were fairly close together and she was felt to be a good candidate for partial mastectomy with bracketed localizations and she was seen by Dr. Lerman preoperatively and felt to be a good candidate for reduction mastopexy at the time of surgery.  [place] lymph node biopsy was also recommended given the extent of the resection being performed.  She underwent a right breast partial mastectomy with bracketed localizations and sentinel lymph node biopsy with tissue rearrangement reduction mastopexy performed by Dr. Lerman on January 8, 2024.  She did have bilateral free nipple skin grafts performed.  The final pathology showed 2 negative right axillary sentinel lymph nodes and 1 negative right axillary nonsentinel lymph node and the wide excision did show a 1.3 cm high-grade micropapillary type invasive duct cancer but no lymphovascular invasion.  The invasive cancer extended to the superior medial margin however all final margins were negative.  She did have extensive DCIS measuring 3.5 cm which was high-grade with close margins on the initial partial mastectomy but again all final margins were negative.  The invasive cancer was ER positive greater than 95%, VA positive at 70%, HER2 2+ equivocal but negative by FISH with a Ki-67 of 30%.  The excess right breast tissue removed by plastic surgery did have 1 focal area with DCIS measuring 1 cm but margins were all negative.  This cancer was a pathologic prognostic stage IA T1 cN0 M0 breast cancer. It was impossible to tell exactly where that area of DCIS originated from.  She did have an atypical intraductal papilloma seen in the excess breast tissue on the left.  The tumor was sent for an Oncotype recurrence score which was 18 and she was seen by Dr. Staples who recommended anastrozole and no chemotherapy.  She was seen by Dr. Collado and underwent external beam radiation which finished in March 2024.  She comes in now for routine follow-up.

## 2025-04-07 NOTE — PHYSICAL EXAM
[de-identified] : s/p right partial mastectomy and SLN, s/p bilateral mastoplexy, mild persistent hyperpigmentation, good cosmetic outcome [Normal] : no joint swelling, no clubbing or cyanosis of the fingernails and muscle strength and tone were normal [de-identified] : deferred

## 2025-04-07 NOTE — HISTORY OF PRESENT ILLNESS
[FreeTextEntry1] : 60yo P1 w/ personal hx of ER/SD+ breast ca dx'd 10/203 s/p surgery and RT. Currently on anastrazole. Pt with pelvic imaging revealing right pelvic mass and thickened EE with biopsy confirmed high grade serous carcinoma, now s/p robo tlh/bso, pelvic slnbx, omental biopsy, resection of right ovarian mass on 3/3/25.  Dx: stage 1A high grade serous carcinoma, no MI, no LVSI, MMRp Tx: robo tlh/bso, pelvic slnbx, omental biopsy, resection of right ovarian mass on 3/3/25. Adjtx: 3/31/25 rad onc consult ([facility])  Since last visit, patient reports doing well. She reports minimal pain and has resumed most normal activity with good tolerance. She no longer requires pain meds. She reports normal appetite. She denies fever/bleeding/bloating. Reports normal urination. She has experienced some constipation which has been relieved with stool softener and prune juice. She has appointment with rad onc on [place] and has already scheduled her VBT appointments. We briefly reviewed vaginal dilator use following radiation and vaginal moisturizers. She discussed feeling somewhat overwhelmed with her diagnosis of endometrial cancer following her breast cancer diagnosis, and we discussed reaching out to Wellness program for support, pt agrees to inquire about services. She has good support at home with her daughter.   Path results reviewed. Pathology 3/3/25:  FINAL PATHOLOGIC DIAGNOSIS CANCER PROTOCOL Specimen Procedure: Total hysterectomy and bilateral salpingo-oophorectomy Hysterectomy Type: Laparoscopic, robotic-assisted Specimen Integrity: Intact Tumor Tumor Site: Endometrium;  Endometrial polyp Tumor Size: Several small microscopic foci Histologic Type: Serous carcinoma Myometrial Invasion: Not identified Adenomyosis: Not identified Uterine Serosa Involvement: Not identified Lower Uterine Segment Involvement: Not identified Cervical Stromal Involvement: Not identified Other Tissue / Organ Involvement: Not identified Peritoneal / Ascitic Fluid: Malignant cells not identified Lymphovascular Invasion (LVI): Not identified Regional Lymph Nodes Regional Lymph Node Status: All regional lymph nodes negative for tumor cells Lymph Nodes Examined Total Number of Pelvic Nodes Examined: 3 Number of Pelvic [place] Nodes Examined: 3 Total Number of Para-aortic Nodes Examined: 0 Pathologic Stage Classification (pTNM, AJCC 8th Edition) pT Category: pT1a Regional Lymph Nodes Modifier: (sn) pN Category: pN0 FIGO Stage FIGO Stage: IA Comment: The carcinoma is p53 and p16 positive with a high Ki-67, supporting the diagnosis . Normal mismatch repair (MMR) testing by immunohistochemistry Additional Findings Additional Findings: Endometrial polyp; Leiomyomas; Right ovarian endometrioma Best Tumor Blocks for Future Studies Tumor Block(s): D12  A. RIGHT FALLOPIAN TUBE AND OVARY MASS B.  RIGHT PELVIC SENTINEL LYMPH NODE C.  LEFT PELVIC SENTINEL LYMPH NODE D.  UTERUS, CERVIX, LEFT FALLOPIAN TUBE AND OVARY E.  PORTION OF OMENTUM  A.  RIGHT FALLOPIAN TUBE AND OVARIAN MASS: - Benign endometriotic cyst/endometrioma (16 cm) with associated reactive changes - Immunohistochemical stain results support the diagnosis Calretinin: Positive in mesothelial cells MOC31, PAX8: Positive in epithelial lining cells p53: Negative in epithelial lining cells p16: Negative for block-like staining in epithelial lining cells - Benign ovary with stromal hyperplasia - Benign fallopian tube B.  RIGHT PELVIC SENTINEL LYMPH NODE: - One lymph node, negative for carcinoma on H E and cytokeratin stains C.  LEFT PELVIC SENTINEL LYMPH NODE: - Two lymph nodes, negative for carcinoma on H E and cytokeratin stains D.  UTERUS, CERVIX, LEFT FALLOPIAN TUBE AND OVARY (101 Grams): - Serous carcinoma, high grade, small microscopic foci partially involving the surface and superficial glands of an endometrial polyp and adjacent endometrium. - Negative for myometrial invasion - Normal mismatch repair (MMR) by immunohistochemistry (Intact nuclear staining for MLH1, MSH2, MSH6 and PMS2) - Immunohistochemical stain results support the diagnosis P53: Positive P16: Positive block-like staining ER: Only weak positive Ki-67: High - Remaining endometrium is inactive with cystic changes - Uterine leiomyomas, submucosal, intramural and subserosal, the largest 1.8 cm - Benign cervix with squamous metaplasia and cysts - Bilateral parametrial tissue, negative for carcinoma - Benign ovary with stromal hyperplasia - Benign fallopian tube E.  PORTION OF OMENTUM: - Benign omental tissue  Patient's previous endometrial biopsy ([id number]; 2/14/25) High grade serous carcinoma involving endometrial polyp and seen as detached superficial fragments; Positive: p53, p16, PAX8, ER (weak), SD (weak), Ki-67 >50%  Intraoperative Diagnosis  A. Right fallopian tube and ovary mass, frozen section diagnosis: - Benign hemorrhagic cyst.  Enlarged ovary with stromal hyperplasia  Intraoperative Diagnosis         (Continued)  By Dr. Taylor to Dr. Giorgio Donovan at 12:04 PM  03/03/2025

## 2025-04-07 NOTE — HISTORY OF PRESENT ILLNESS
[FreeTextEntry1] : Mrs. Grullon is a 59-year-old female, she was diagnosed with Stage I ER/CT+ invasive ductal carcinoma of the right breast status post right breast partial mastectomy with sentinel lymph node biopsy and tissue rearrangement reduction mastopexy, status post adjuvant radiation therapy to the right breast.in 10/2024. She is now presenting with a newly diagnosed serous endometrial carcinoma.  9/13/24 - PAP negative   Pelvic imaging revealed a right pelvic mass, fibroid uterus, thickened EE, and labs with elevated ca125 and cea. Endometrial biopsy was performed 9/27/24 and revealed strips of atrophic endometrial epithelium.  CT c/a/p 10/4/25: revealed a 15/1cm lobulated low density mass in the anterior pelvis, abutting and inseparable from the right ovary.   10/21/2024 MRI Pelvis - Lobulated T1 hyperintense structure measuring up to 13.8 cm extending from the right lower quadrant into the pelvis with differential considerations including right proteinaceous paraovarian cyst versus peritoneal inclusion cyst - Uterine fibroids measuring up to 2.1 cm including a subcentimeter submucosal/intracavitary fibroid - 0.6 cm heterogeneous structure within the fundal endometrial canal possibly representing additional submucosal fibroid versus polyp    Ms. Grullon was evaluated by Dr. Faulkner and endometrial biopsy was performed on 2/14/25 that revealed: FINAL PATHOLOGIC DIAGNOSIS ENDOMETRIAL BIOPSY: 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  On 2/25/25 CT chest was performed and revealed a somewhat poorly defined 7mm and smaller subsolid nodular densities bilaterally with upper lung zone predilection are nonspecific and most likely inflammatory or post-inflammatory. There was a bandlike left basilar opacity with somewhat nodular appearing component measuring 9x8mm is nonspecific and may be atelectasis or fibrosis.   2/25/25 - CT A/P revealed a stable lobulated cystic structure in the pelvis measuring 12.5cm. Possible paraovarian cyst or peritoneal inclusion cyst. No lymphadenopathy. The was fat containing umbilical hernia and a left lower lobe nodular opacity measuring 0.8cm that appeared somewhat focal, and an area of mild atelectasis.   On 3/3/25, Dr. Giorgio Donovan performed a robotic tlh/bso/slnbx/omx/pelvic mass resection/possible laparotomy Final path demonstrated a stage 1A high grade serous carcinoma. pelvic washings were negative. The disease was confined to a polyp, there was no myometrial invasion, no LVI, SLNs negative, MMRp. All regional lymph nodes were positive for tumor cells (0/3). FIGO Stage IA pT1a pN0. The carcinoma was p53 and p16 positive with a high Ki-67.   Ms. Grullon presents  today to discuss the potential benefits of adjuvant therapy with vaginal brachytherapy.  She tolerated the surgery well but is concerned about her second cancer diagnosis is such a short time span.

## 2025-04-07 NOTE — HISTORY OF PRESENT ILLNESS
[FreeTextEntry1] : Mrs. Grullon is a 59-year-old female, she was diagnosed with Stage I ER/ID+ invasive ductal carcinoma of the right breast status post right breast partial mastectomy with sentinel lymph node biopsy and tissue rearrangement reduction mastopexy, status post adjuvant radiation therapy to the right breast.in 10/2024. She is now presenting with a newly diagnosed serous endometrial carcinoma.  9/13/24 - PAP negative   Pelvic imaging revealed a right pelvic mass, fibroid uterus, thickened EE, and labs with elevated ca125 and cea. Endometrial biopsy was performed 9/27/24 and revealed strips of atrophic endometrial epithelium.  CT c/a/p 10/4/25: revealed a 15/1cm lobulated low density mass in the anterior pelvis, abutting and inseparable from the right ovary.   10/21/2024 MRI Pelvis - Lobulated T1 hyperintense structure measuring up to 13.8 cm extending from the right lower quadrant into the pelvis with differential considerations including right proteinaceous paraovarian cyst versus peritoneal inclusion cyst - Uterine fibroids measuring up to 2.1 cm including a subcentimeter submucosal/intracavitary fibroid - 0.6 cm heterogeneous structure within the fundal endometrial canal possibly representing additional submucosal fibroid versus polyp    Ms. Grullon was evaluated by Dr. Faulkner and endometrial biopsy was performed on 2/14/25 that revealed: FINAL PATHOLOGIC DIAGNOSIS ENDOMETRIAL BIOPSY: 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  On 2/25/25 CT chest was performed and revealed a somewhat poorly defined 7mm and smaller subsolid nodular densities bilaterally with upper lung zone predilection are nonspecific and most likely inflammatory or post-inflammatory. There was a bandlike left basilar opacity with somewhat nodular appearing component measuring 9x8mm is nonspecific and may be atelectasis or fibrosis.   2/25/25 - CT A/P revealed a stable lobulated cystic structure in the pelvis measuring 12.5cm. Possible paraovarian cyst or peritoneal inclusion cyst. No lymphadenopathy. The was fat containing umbilical hernia and a left lower lobe nodular opacity measuring 0.8cm that appeared somewhat focal, and an area of mild atelectasis.   On 3/3/25, Dr. Giorgio Donovan performed a robotic tlh/bso/slnbx/omx/pelvic mass resection/possible laparotomy Final path demonstrated a stage 1A high grade serous carcinoma. pelvic washings were negative. The disease was confined to a polyp, there was no myometrial invasion, no LVI, SLNs negative, MMRp. All regional lymph nodes were positive for tumor cells (0/3). FIGO Stage IA pT1a pN0. The carcinoma was p53 and p16 positive with a high Ki-67.   Ms. Grullon presents  today to discuss the potential benefits of adjuvant therapy with vaginal brachytherapy.  She tolerated the surgery well but is concerned about her second cancer diagnosis is such a short time span.

## 2025-04-07 NOTE — DISEASE MANAGEMENT
[Clinical] : TNM Stage: c [IA] : IA [FreeTextEntry4] : Invasive Ductal Carcinoma of the Right Breast, ER/ID+ [Pathological] : TNM Stage: p [TTNM] : 1a [NTNM] : 0 [MTNM] : X [I] : I

## 2025-04-07 NOTE — DISCUSSION/SUMMARY
[FreeTextEntry1] : 58yo P1 w/ personal hx of ER/MN+ breast ca dx'd 10/203 s/p surgery and RT. Currently on anastrazole. Pt with pelvic imaging revealing right pelvic mass and thickened EE with biopsy confirmed high grade serous carcinoma, now s/p robo tlh/bso, pelvic slnbx, omental biopsy, resection of right ovarian mass on 3/3/25, here for cuff check and found to be recovering well. Pathology and NCCN guidelines reviewed. -more than 50% of visit spent face to face with patient counseling and coordinating care  -reviewed pathology and plan for adjuvant therapy with VBT as per NCCN. all questions answered and patient expressed understanding   -pain/fever/bleeding precautions given

## 2025-04-07 NOTE — HISTORY OF PRESENT ILLNESS
[FreeTextEntry1] : The patient is a 59-year-old G3, P1 postmenopausal woman.  She underwent menarche at age 11.  She has a family history with her paternal aunt with breast cancer at age 50.  She was found to have some suspicious calcifications seen on mammography from [facility] radiology in [facility] on October 13, 2021 and was seen by Dr. Fernandez at that time and underwent right breast 12:00 stereotactic core biopsy on November 17, 2021 just showing fibroadenomatoid change and benign calcifications.  Follow-up mammographies in 2022 showed likely vascular or milk of calcium calcifications.  A more recent bilateral mammography performed on September 11, 2023 showed increasing calcifications again in the upper outer aspect of the right breast as well as a partially circumscribed mass in the upper outer aspect of the right breast and stereotactic core biopsies were performed.  The upper outer quadrant mass with calcifications showed intermediate to high-grade DCIS which was ER/WY positive and she had an "hourglass" clip placed at that biopsy site and the upper outer quadrant calcifications showed intermediate grade DCIS again ER/WY positive with a "stoplight" clip placed at that biopsy site.  She underwent [facility] genetic panel testing in November 2023 which was negative.  MRI was performed on December 7, 2023 showing the area of the 2 clips with this localized upper outer quadrant region of non-mass like enhancement over an area of 4 x 3.5 x 3.5 cm.  I did review her postbiopsy mammographic imaging as well as her recent MRI and these 2 clips were fairly close together and she was felt to be a good candidate for partial mastectomy with bracketed localizations and she was seen by Dr. Lerman preoperatively and felt to be a good candidate for reduction mastopexy at the time of surgery.  [place] lymph node biopsy was also recommended given the extent of the resection being performed.  She underwent a right breast partial mastectomy with bracketed localizations and sentinel lymph node biopsy with tissue rearrangement reduction mastopexy performed by Dr. Lerman on January 8, 2024.  She did have bilateral free nipple skin grafts performed.  The final pathology showed 2 negative right axillary sentinel lymph nodes and 1 negative right axillary nonsentinel lymph node and the wide excision did show a 1.3 cm high-grade micropapillary type invasive duct cancer but no lymphovascular invasion.  The invasive cancer extended to the superior medial margin however all final margins were negative.  She did have extensive DCIS measuring 3.5 cm which was high-grade with close margins on the initial partial mastectomy but again all final margins were negative.  The invasive cancer was ER positive greater than 95%, WY positive at 70%, HER2 2+ equivocal but negative by FISH with a Ki-67 of 30%.  The excess right breast tissue removed by plastic surgery did have 1 focal area with DCIS measuring 1 cm but margins were all negative.  This cancer was a pathologic prognostic stage IA T1 cN0 M0 breast cancer. It was impossible to tell exactly where that area of DCIS originated from.  She did have an atypical intraductal papilloma seen in the excess breast tissue on the left.  The tumor was sent for an Oncotype recurrence score which was 18 and she was seen by Dr. Staples who recommended anastrozole and no chemotherapy.  She was seen by Dr. Collado and underwent external beam radiation which finished in March 2024.  She comes in now for routine follow-up.

## 2025-04-07 NOTE — HISTORY OF PRESENT ILLNESS
[FreeTextEntry1] : 58yo P1 w/ personal hx of ER/DC+ breast ca dx'd 10/203 s/p surgery and RT. Currently on anastrazole. Pt with pelvic imaging revealing right pelvic mass and thickened EE with biopsy confirmed high grade serous carcinoma, now s/p robo tlh/bso, pelvic slnbx, omental biopsy, resection of right ovarian mass on 3/3/25.  Dx: stage 1A high grade serous carcinoma, no MI, no LVSI, MMRp Tx: robo tlh/bso, pelvic slnbx, omental biopsy, resection of right ovarian mass on 3/3/25. Adjtx: 3/31/25 rad onc consult ([facility])  Since last visit, patient reports doing well. She reports minimal pain and has resumed most normal activity with good tolerance. She no longer requires pain meds. She reports normal appetite. She denies fever/bleeding/bloating. Reports normal urination. She has experienced some constipation which has been relieved with stool softener and prune juice. She has appointment with rad onc on [place] and has already scheduled her VBT appointments. We briefly reviewed vaginal dilator use following radiation and vaginal moisturizers. She discussed feeling somewhat overwhelmed with her diagnosis of endometrial cancer following her breast cancer diagnosis, and we discussed reaching out to Wellness program for support, pt agrees to inquire about services. She has good support at home with her daughter.   Path results reviewed. Pathology 3/3/25:  FINAL PATHOLOGIC DIAGNOSIS CANCER PROTOCOL Specimen Procedure: Total hysterectomy and bilateral salpingo-oophorectomy Hysterectomy Type: Laparoscopic, robotic-assisted Specimen Integrity: Intact Tumor Tumor Site: Endometrium;  Endometrial polyp Tumor Size: Several small microscopic foci Histologic Type: Serous carcinoma Myometrial Invasion: Not identified Adenomyosis: Not identified Uterine Serosa Involvement: Not identified Lower Uterine Segment Involvement: Not identified Cervical Stromal Involvement: Not identified Other Tissue / Organ Involvement: Not identified Peritoneal / Ascitic Fluid: Malignant cells not identified Lymphovascular Invasion (LVI): Not identified Regional Lymph Nodes Regional Lymph Node Status: All regional lymph nodes negative for tumor cells Lymph Nodes Examined Total Number of Pelvic Nodes Examined: 3 Number of Pelvic [place] Nodes Examined: 3 Total Number of Para-aortic Nodes Examined: 0 Pathologic Stage Classification (pTNM, AJCC 8th Edition) pT Category: pT1a Regional Lymph Nodes Modifier: (sn) pN Category: pN0 FIGO Stage FIGO Stage: IA Comment: The carcinoma is p53 and p16 positive with a high Ki-67, supporting the diagnosis . Normal mismatch repair (MMR) testing by immunohistochemistry Additional Findings Additional Findings: Endometrial polyp; Leiomyomas; Right ovarian endometrioma Best Tumor Blocks for Future Studies Tumor Block(s): D12  A. RIGHT FALLOPIAN TUBE AND OVARY MASS B.  RIGHT PELVIC SENTINEL LYMPH NODE C.  LEFT PELVIC SENTINEL LYMPH NODE D.  UTERUS, CERVIX, LEFT FALLOPIAN TUBE AND OVARY E.  PORTION OF OMENTUM  A.  RIGHT FALLOPIAN TUBE AND OVARIAN MASS: - Benign endometriotic cyst/endometrioma (16 cm) with associated reactive changes - Immunohistochemical stain results support the diagnosis Calretinin: Positive in mesothelial cells MOC31, PAX8: Positive in epithelial lining cells p53: Negative in epithelial lining cells p16: Negative for block-like staining in epithelial lining cells - Benign ovary with stromal hyperplasia - Benign fallopian tube B.  RIGHT PELVIC SENTINEL LYMPH NODE: - One lymph node, negative for carcinoma on H E and cytokeratin stains C.  LEFT PELVIC SENTINEL LYMPH NODE: - Two lymph nodes, negative for carcinoma on H E and cytokeratin stains D.  UTERUS, CERVIX, LEFT FALLOPIAN TUBE AND OVARY (101 Grams): - Serous carcinoma, high grade, small microscopic foci partially involving the surface and superficial glands of an endometrial polyp and adjacent endometrium. - Negative for myometrial invasion - Normal mismatch repair (MMR) by immunohistochemistry (Intact nuclear staining for MLH1, MSH2, MSH6 and PMS2) - Immunohistochemical stain results support the diagnosis P53: Positive P16: Positive block-like staining ER: Only weak positive Ki-67: High - Remaining endometrium is inactive with cystic changes - Uterine leiomyomas, submucosal, intramural and subserosal, the largest 1.8 cm - Benign cervix with squamous metaplasia and cysts - Bilateral parametrial tissue, negative for carcinoma - Benign ovary with stromal hyperplasia - Benign fallopian tube E.  PORTION OF OMENTUM: - Benign omental tissue  Patient's previous endometrial biopsy ([id number]; 2/14/25) High grade serous carcinoma involving endometrial polyp and seen as detached superficial fragments; Positive: p53, p16, PAX8, ER (weak), DC (weak), Ki-67 >50%  Intraoperative Diagnosis  A. Right fallopian tube and ovary mass, frozen section diagnosis: - Benign hemorrhagic cyst.  Enlarged ovary with stromal hyperplasia  Intraoperative Diagnosis         (Continued)  By Dr. Taylor to Dr. Giorgio Donovan at 12:04 PM  03/03/2025

## 2025-04-07 NOTE — REVIEW OF SYSTEMS
December 5, 2024       Johnathan Davis MD  [address] [employer] [id number]  Via In Basket      Patient: Rashaun Luong   YOB: 1971   Date of Visit: 12/5/2024       Dear Dr. Davis:    I saw your patient, Rashaun Luong, for an evaluation. Below are my notes for this visit with him.    If you have questions, please do not hesitate to call me.      Sincerely,        Job Hoff MD        CC: No Recipients  Job Hoff MD  12/5/2024  3:06 PM  Signed  [facility]  GI Consultation Note    REQUESTING PROVIDER:  Johnathan Davis MD    CHIEF COMPLAINT:  Office Visit (Positive occult test no prior procedures)       SUBJECTIVE:    Rashaun Luong is a 53 year old male seen today at the request of Johnathan Davis MD for positive stool fit test.  He has never had a colonoscopy in the past.  He is otherwise asymptomatic.  No abdominal pain.  He does admit to alcohol consumption and had slight elevation of his transaminases.    PROBLEM LIST:                  Patient Active Problem List   Diagnosis   • AVM (arteriovenous malformation) (CMD)   • Visit for preventive health examination   • Primary hypertension       HISTORIES:    ALLERGIES:  No Known Allergies  Past Medical History:   Diagnosis Date   • Hypertension      History reviewed. No pertinent surgical history.  Social History     Tobacco Use   • Smoking status: Every Day     Types: Cigarettes   • Smokeless tobacco: Never   Substance Use Topics   • Alcohol use: Yes     Alcohol/week: 8.0 standard drinks of alcohol     Types: 8 Cans of beer per week     Drug Use:    Never              Family History   Problem Relation Age of Onset   • Cancer Mother         MEDICATIONS:       Medications    Medication Directions   acetaminophen (TYLENOL) 325 MG tablet Take 650 mg by mouth.   amLODIPine (NORVASC) 10 MG tablet Take 1 tablet by mouth daily.   hydroCORTisone (CORTIZONE) 2.5 % cream Apply 1 Application topically in the morning and 1 Application in the  evening.       REVIEW OF SYSTEMS:    All other systems are reviewed and are negative except as documented in the history of present illness.    PHYSICAL EXAM:    Vital Signs: Blood pressure (!) 136/90, pulse 83, temperature 98.4 °F (36.9 °C), temperature source Oral, resp. rate 18, height 5' 5\" (1.651 m), weight 70.9 kg (156 lb 4.9 oz), SpO2 97%.  General: The patient is well developed, well nourished, in no acute distress, appears stated age.   Neurologic: Alert and oriented. Normal mood and affect, normal speech, no gross tremor.  Skin: Warm and dry, normal turgor.  Head: Normocephalic.  Eyes: Normal conjunctivae and sclerae.  Extraocular movements grossly intact.  HEENT: Oropharynx clear, moist mucous membranes, nasal mucosa is normal to inspection.  Neck: Symmetric without swelling or tenderness.   Respiratory: Lungs are clear to auscultation bilaterally  Cardiovascular: Normal S1 and S2, Regular rate and rhythm.  Extremities: No edema or tenderness.  Abdomen: Soft, non-tender, non-distended, no hepatosplenomegaly,  no rebound or guarding.  Normal bowel sounds.      LABORATORY DATA:   Lab Services on 08/31/2024   Component Date Value Ref Range Status   • TSH 08/31/2024 2.408  0.350 - 5.000 mcUnits/mL Final    Findings most consistent with euthyroid state, no additional testing suggested. TSH may be normal in patients with thyroid dysfunction and pituitary disease. Clinical correlation recommended.    (Reflex TSH algorithm is not recommended in hospitalized patients. A variety of drugs, as well as serious acute and chronic illnesses may alter thyroid function tests. Commonly implicated drugs include glucocorticoids, dopamine, carbamazepine, iodine, amiodarone, lithium and heparin.)   • iFOB (aka FIT) - Fecal Occult Blood 08/31/2024 Positive (A)  Negative Final   • Hemoglobin A1C 08/31/2024 5.4  4.5 - 5.6 % Final      Diabetic Screening  Non Diabetic:             <5.7%  Increased Risk:            5.7-6.4%  Diagnostic For Diabetes:  >6.4%    Diabetic Control  A1C%       eAG mg/dL  6.0            126  6.5            140  7.0            154  7.5            169  8.0            183  8.5            197  9.0            212  9.5            226  10.0           240   • Sodium 08/31/2024 136  135 - 145 mmol/L Final   • Potassium 08/31/2024 3.6  3.4 - 5.1 mmol/L Final   • Chloride 08/31/2024 102  97 - 110 mmol/L Final   • Carbon Dioxide 08/31/2024 22  21 - 32 mmol/L Final   • Anion Gap 08/31/2024 16  7 - 19 mmol/L Final   • Glucose 08/31/2024 134 (H)  70 - 99 mg/dL Final   • BUN 08/31/2024 3 (L)  6 - 20 mg/dL Final   • Creatinine 08/31/2024 0.76  0.67 - 1.17 mg/dL Final   • Glomerular Filtration Rate 08/31/2024 >90  >=60 Final    eGFR results = or >60 mL/min/1.73m2 = Normal kidney function. Estimated GFR calculated using the CKD-EPI-R (2021) equation that does not include race in the creatinine calculation.   • BUN/Cr 08/31/2024 4 (L)  7 - 25 Final   • Calcium 08/31/2024 8.8  8.4 - 10.2 mg/dL Final   • Bilirubin, Total 08/31/2024 0.5  0.2 - 1.0 mg/dL Final   • GOT/AST 08/31/2024 43 (H)  <=37 Units/L Final   • GPT/ALT 08/31/2024 55  <64 Units/L Final   • Alkaline Phosphatase 08/31/2024 99  45 - 117 Units/L Final   • Albumin 08/31/2024 3.3 (L)  3.6 - 5.1 g/dL Final   • Protein, Total 08/31/2024 6.8  6.4 - 8.2 g/dL Final   • Globulin 08/31/2024 3.5  2.0 - 4.0 g/dL Final   • A/G Ratio 08/31/2024 0.9 (L)  1.0 - 2.4 Final   • Cholesterol 08/31/2024 190  <=199 mg/dL Final      Desirable         <200  Borderline High   200 to 239  High              >=240   • Triglycerides 08/31/2024 214 (H)  <=149 mg/dL Final      Normal            <150  Borderline High   150 to 199  High              200 to 499  Very High         >=500   • HDL 08/31/2024 52  >=40 mg/dL Final      Low              <40  Borderline Low   40 to 49  Near Optimal     50 to 59  Optimal          >=60   • LDL 08/31/2024 95  <=129 mg/dL Final      Optimal            <100  Near Optimal      100 to 129  Borderline High   130 to 159  High              160 to 189  Very High         >=190   • Non-HDL Cholesterol 08/31/2024 138  mg/dL Final      Therapeutic Target:  CHD and risk equivalents  <130  Multiple risk factors     <160  0 to 1 risk factor        <190   • Cholesterol/ HDL Ratio 08/31/2024 3.7  <=4.4 Final   • COLOR, URINALYSIS 08/31/2024 Straw   Final   • APPEARANCE, URINALYSIS 08/31/2024 Clear   Final   • GLUCOSE, URINALYSIS 08/31/2024 Negative  Negative mg/dL Final   • BILIRUBIN, URINALYSIS 08/31/2024 Negative  Negative Final   • KETONES, URINALYSIS 08/31/2024 Negative  Negative mg/dL Final   • SPECIFIC GRAVITY, URINALYSIS 08/31/2024 1.007  1.005 - 1.030 Final    Measured by refractometry   • OCCULT BLOOD, URINALYSIS 08/31/2024 Negative  Negative Final   • PH, URINALYSIS 08/31/2024 6.5  5.0 - 7.0 Final   • PROTEIN, URINALYSIS 08/31/2024 Negative  Negative mg/dL Final   • UROBILINOGEN, URINALYSIS 08/31/2024 0.2  0.2, 1.0 mg/dL Final   • NITRITE, URINALYSIS 08/31/2024 Negative  Negative Final   • LEUKOCYTE ESTERASE, URINALYSIS 08/31/2024 Negative  Negative Final   • Prostate Specific Antigen 08/31/2024 0.83  <=3.50 ng/mL Final    [employer] Chemiluminescence. Results obtained with different assay methods or kits cannot be used interchangeably.     • WBC 08/31/2024 9.0  4.2 - 11.0 K/mcL Final   • RBC 08/31/2024 5.07  4.50 - 5.90 mil/mcL Final   • HGB 08/31/2024 17.4 (H)  13.0 - 17.0 g/dL Final   • HCT 08/31/2024 53.0 (H)  39.0 - 51.0 % Final   • MCV 08/31/2024 104.5 (H)  78.0 - 100.0 fl Final   • MCH 08/31/2024 34.3 (H)  26.0 - 34.0 pg Final   • MCHC 08/31/2024 32.8  32.0 - 36.5 g/dL Final   • RDW-CV 08/31/2024 12.3  11.0 - 15.0 % Final   • RDW-SD 08/31/2024 47.6  39.0 - 50.0 fL Final   • PLT 08/31/2024 205  140 - 450 K/mcL Final   • NRBC 08/31/2024 0  <=0 /100 WBC Final   • Neutrophil, Percent 08/31/2024 68  % Final   • Lymphocytes, Percent 08/31/2024 19  % Final   • Mono,  Percent 08/31/2024 8  % Final   • Eosinophils, Percent 08/31/2024 2  % Final   • Basophils, Percent 08/31/2024 1  % Final   • Immature Granulocytes 08/31/2024 2  % Final   • Absolute Neutrophils 08/31/2024 6.1  1.8 - 7.7 K/mcL Final   • Absolute Lymphocytes 08/31/2024 1.7  1.0 - 4.0 K/mcL Final   • Absolute Monocytes 08/31/2024 0.7  0.3 - 0.9 K/mcL Final   • Absolute Eosinophils  08/31/2024 0.2  0.0 - 0.5 K/mcL Final   • Absolute Basophils 08/31/2024 0.1  0.0 - 0.3 K/mcL Final   • Absolute Immature Granulocytes 08/31/2024 0.2  0.0 - 0.2 K/mcL Final   • Retic ABS 08/31/2024 117  10 - 120 K/mcL Final   • Immature Retic Frac 08/31/2024 14.1  1.5 - 16.0 % Final   • Retic Count 08/31/2024 2.4  0.3 - 2.5 % Final   • Reticulocyte Hemoglobin Content 08/31/2024 37.5 (H)  28.6 - 36.3 pg Final   • Vitamin B12 08/31/2024 >2,000 (H)  211 - 911 pg/mL Final   • Folate 08/31/2024 8.4  >=5.5 ng/mL Final       RECENT IMAGING:     LAST MRI:  No results found for this or any previous visit.    LAST CT:  No results found for this or any previous visit.    LAST X-RAY:  No results found for this or any previous visit.    LAST U/S:  No results found for this or any previous visit.      ASSESSMENT/PLAN:    Positive stool fit test, need for further investigation.    Plan: Colonoscopy will be arranged at his convenience and further recommendations to follow.      The patient indicated understanding of the diagnosis and agreed with the plan of care.     A copy of this note was sent to the referring provider. Thank you for allowing me to participate in the care of this patient.     [Fatigue: Grade 0] : Fatigue: Grade 0 [Breast Pain: Grade 1 - Mild pain] : Breast Pain: Grade 1 - Mild pain [Skin Hyperpigmentation: Grade 0] : Skin Hyperpigmentation: Grade 0 [Dermatitis Radiation: Grade 0] : Dermatitis Radiation: Grade 0 [FreeTextEntry7] : Occasional

## 2025-04-07 NOTE — HISTORY OF PRESENT ILLNESS
[FreeTextEntry1] : 58yo P1 w/ personal hx of ER/SC+ breast ca dx'd 10/203 s/p surgery and RT. Currently on anastrazole. Pt with pelvic imaging revealing right pelvic mass and thickened EE with biopsy confirmed high grade serous carcinoma, now s/p robo tlh/bso, pelvic slnbx, omental biopsy, resection of right ovarian mass on 3/3/25.  Dx: stage 1A high grade serous carcinoma, no MI, no LVSI, MMRp Tx: robo tlh/bso, pelvic slnbx, omental biopsy, resection of right ovarian mass on 3/3/25. Adjtx: 3/31/25 rad onc consult ([facility])  Since last visit, patient reports doing well. She reports minimal pain and has resumed most normal activity with good tolerance. She no longer requires pain meds. She reports normal appetite. She denies fever/bleeding/bloating. Reports normal urination. She has experienced some constipation which has been relieved with stool softener and prune juice. She has appointment with rad onc on Weds and has already scheduled her VBT appointments. We briefly reviewed vaginal dilator use following radiation and vaginal moisturizers. She discussed feeling somewhat overwhelmed with her diagnosis of endometrial cancer following her breast cancer diagnosis, and we discussed reaching out to Wellness program for support, pt agrees to inquire about services. She has good support at home with her daughter.   Path results reviewed. Pathology 3/3/25:  FINAL PATHOLOGIC DIAGNOSIS CANCER PROTOCOL Specimen Procedure: Total hysterectomy and bilateral salpingo-oophorectomy Hysterectomy Type: Laparoscopic, robotic-assisted Specimen Integrity: Intact Tumor Tumor Site: Endometrium;  Endometrial polyp Tumor Size: Several small microscopic foci Histologic Type: Serous carcinoma Myometrial Invasion: Not identified Adenomyosis: Not identified Uterine Serosa Involvement: Not identified Lower Uterine Segment Involvement: Not identified Cervical Stromal Involvement: Not identified Other Tissue / Organ Involvement: Not identified Peritoneal / Ascitic Fluid: Malignant cells not identified Lymphovascular Invasion (LVI): Not identified Regional Lymph Nodes Regional Lymph Node Status: All regional lymph nodes negative for tumor cells Lymph Nodes Examined Total Number of Pelvic Nodes Examined: 3 Number of Pelvic [place] Nodes Examined: 3 Total Number of Para-aortic Nodes Examined: 0 Pathologic Stage Classification (pTNM, AJCC 8th Edition) pT Category: pT1a Regional Lymph Nodes Modifier: (sn) pN Category: pN0 FIGO Stage FIGO Stage: IA Comment: The carcinoma is p53 and p16 positive with a high Ki-67, supporting the diagnosis . Normal mismatch repair (MMR) testing by immunohistochemistry Additional Findings Additional Findings: Endometrial polyp; Leiomyomas; Right ovarian endometrioma Best Tumor Blocks for Future Studies Tumor Block(s): D12  A. RIGHT FALLOPIAN TUBE AND OVARY MASS B.  RIGHT PELVIC SENTINEL LYMPH NODE C.  LEFT PELVIC SENTINEL LYMPH NODE D.  UTERUS, CERVIX, LEFT FALLOPIAN TUBE AND OVARY E.  PORTION OF OMENTUM  A.  RIGHT FALLOPIAN TUBE AND OVARIAN MASS: - Benign endometriotic cyst/endometrioma (16 cm) with associated reactive changes - Immunohistochemical stain results support the diagnosis Calretinin: Positive in mesothelial cells MOC31, PAX8: Positive in epithelial lining cells p53: Negative in epithelial lining cells p16: Negative for block-like staining in epithelial lining cells - Benign ovary with stromal hyperplasia - Benign fallopian tube B.  RIGHT PELVIC SENTINEL LYMPH NODE: - One lymph node, negative for carcinoma on H E and cytokeratin stains C.  LEFT PELVIC SENTINEL LYMPH NODE: - Two lymph nodes, negative for carcinoma on H E and cytokeratin stains D.  UTERUS, CERVIX, LEFT FALLOPIAN TUBE AND OVARY ([address]): - Serous carcinoma, high grade, small microscopic foci partially involving the surface and superficial glands of an endometrial polyp and adjacent endometrium. - Negative for myometrial invasion - Normal mismatch repair (MMR) by immunohistochemistry (Intact nuclear staining for MLH1, MSH2, MSH6 and PMS2) - Immunohistochemical stain results support the diagnosis P53: Positive P16: Positive block-like staining ER: Only weak positive Ki-67: High - Remaining endometrium is inactive with cystic changes - Uterine leiomyomas, submucosal, intramural and subserosal, the largest 1.8 cm - Benign cervix with squamous metaplasia and cysts - Bilateral parametrial tissue, negative for carcinoma - Benign ovary with stromal hyperplasia - Benign fallopian tube E.  PORTION OF OMENTUM: - Benign omental tissue  Patient's previous endometrial biopsy ([id number]; 2/14/25) High grade serous carcinoma involving endometrial polyp and seen as detached superficial fragments; Positive: p53, p16, PAX8, ER (weak), SC (weak), Ki-67 >50%  Intraoperative Diagnosis  A. Right fallopian tube and ovary mass, frozen section diagnosis: - Benign hemorrhagic cyst.  Enlarged ovary with stromal hyperplasia  Intraoperative Diagnosis         (Continued)  By Dr. Taylor to Dr. Giorgio Donovan at 12:04 PM  03/03/2025

## 2025-04-11 NOTE — PHYSICAL EXAM
[Normal Vaginal Cuff] : vaginal cuff without lesion or nodularity [de-identified] : vaginal cuff appears well healed

## 2025-04-11 NOTE — HISTORY OF PRESENT ILLNESS
[FreeTextEntry1] : Mrs. Grullon is a 59-year-old female, she was diagnosed with Stage I ER/VT+ invasive ductal carcinoma of the right breast status post right breast partial mastectomy with sentinel lymph node biopsy and tissue rearrangement reduction mastopexy, status post adjuvant radiation therapy to the right breast.in 10/2024. She is now presenting with a newly diagnosed serous endometrial carcinoma. She is present today for a brief follow-up and CT SIM.   9/13/24 - PAP negative   Pelvic imaging revealed a right pelvic mass, fibroid uterus, thickened EE, and labs with elevated ca125 and cea. Endometrial biopsy was performed 9/27/24 and revealed strips of atrophic endometrial epithelium.  CT c/a/p 10/4/25: revealed a 15/1cm lobulated low density mass in the anterior pelvis, abutting and inseparable from the right ovary.   10/21/2024 MRI Pelvis - Lobulated T1 hyperintense structure measuring up to 13.8 cm extending from the right lower quadrant into the pelvis with differential considerations including right proteinaceous paraovarian cyst versus peritoneal inclusion cyst - Uterine fibroids measuring up to 2.1 cm including a subcentimeter submucosal/intracavitary fibroid - 0.6 cm heterogeneous structure within the fundal endometrial canal possibly representing additional submucosal fibroid versus polyp    Ms. Grullon was evaluated by Dr. Faulkner and endometrial biopsy was performed on 2/14/25 that revealed: FINAL PATHOLOGIC DIAGNOSIS ENDOMETRIAL BIOPSY: 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  On 2/25/25 CT chest was performed and revealed a somewhat poorly defined 7mm and smaller subsolid nodular densities bilaterally with upper lung zone predilection are nonspecific and most likely inflammatory or post-inflammatory. There was a bandlike left basilar opacity with somewhat nodular appearing component measuring 9x8mm is nonspecific and may be atelectasis or fibrosis.   2/25/25 - CT A/P revealed a stable lobulated cystic structure in the pelvis measuring 12.5cm. Possible paraovarian cyst or peritoneal inclusion cyst. No lymphadenopathy. The was fat containing umbilical hernia and a left lower lobe nodular opacity measuring 0.8cm that appeared somewhat focal, and an area of mild atelectasis.   On 3/3/25, Dr. Giorgio Donovan performed a robotic tlh/bso/slnbx/omx/pelvic mass resection/possible laparotomy Final path demonstrated a stage 1A high grade serous carcinoma. pelvic washings were negative. The disease was confined to a polyp, there was no myometrial invasion, no LVI, SLNs negative, MMRp. All regional lymph nodes were positive for tumor cells (0/3). FIGO Stage IA pT1a pN0. The carcinoma was p53 and p16 positive with a high Ki-67.

## 2025-04-11 NOTE — HISTORY OF PRESENT ILLNESS
[FreeTextEntry1] : Mrs. Grullon is a 59-year-old female, she was diagnosed with Stage I ER/AK+ invasive ductal carcinoma of the right breast status post right breast partial mastectomy with sentinel lymph node biopsy and tissue rearrangement reduction mastopexy, status post adjuvant radiation therapy to the right breast.in 10/2024. She is now presenting with a newly diagnosed serous endometrial carcinoma. She is present today for a brief follow-up and CT SIM.   9/13/24 - PAP negative   Pelvic imaging revealed a right pelvic mass, fibroid uterus, thickened EE, and labs with elevated ca125 and cea. Endometrial biopsy was performed 9/27/24 and revealed strips of atrophic endometrial epithelium.  CT c/a/p 10/4/25: revealed a 15/1cm lobulated low density mass in the anterior pelvis, abutting and inseparable from the right ovary.   10/21/2024 MRI Pelvis - Lobulated T1 hyperintense structure measuring up to 13.8 cm extending from the right lower quadrant into the pelvis with differential considerations including right proteinaceous paraovarian cyst versus peritoneal inclusion cyst - Uterine fibroids measuring up to 2.1 cm including a subcentimeter submucosal/intracavitary fibroid - 0.6 cm heterogeneous structure within the fundal endometrial canal possibly representing additional submucosal fibroid versus polyp    Ms. Grullon was evaluated by Dr. Faulkner and endometrial biopsy was performed on 2/14/25 that revealed: FINAL PATHOLOGIC DIAGNOSIS ENDOMETRIAL BIOPSY: 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  On 2/25/25 CT chest was performed and revealed a somewhat poorly defined 7mm and smaller subsolid nodular densities bilaterally with upper lung zone predilection are nonspecific and most likely inflammatory or post-inflammatory. There was a bandlike left basilar opacity with somewhat nodular appearing component measuring 9x8mm is nonspecific and may be atelectasis or fibrosis.   2/25/25 - CT A/P revealed a stable lobulated cystic structure in the pelvis measuring 12.5cm. Possible paraovarian cyst or peritoneal inclusion cyst. No lymphadenopathy. The was fat containing umbilical hernia and a left lower lobe nodular opacity measuring 0.8cm that appeared somewhat focal, and an area of mild atelectasis.   On 3/3/25, Dr. Giorgio Donovan performed a robotic tlh/bso/slnbx/omx/pelvic mass resection/possible laparotomy Final path demonstrated a stage 1A high grade serous carcinoma. pelvic washings were negative. The disease was confined to a polyp, there was no myometrial invasion, no LVI, SLNs negative, MMRp. All regional lymph nodes were positive for tumor cells (0/3). FIGO Stage IA pT1a pN0. The carcinoma was p53 and p16 positive with a high Ki-67.

## 2025-04-11 NOTE — PHYSICAL EXAM
[Normal Vaginal Cuff] : vaginal cuff without lesion or nodularity [de-identified] : vaginal cuff appears well healed

## 2025-05-22 NOTE — VITALS
[Maximal Pain Intensity: 2/10] : 2/10 [Least Pain Intensity: 0/10] : 0/10 [90: Able to carry normal activity; minor signs or symptoms of disease.] : 90: Able to carry normal activity; minor signs or symptoms of disease.  [Date: ____________] : Patient's last distress assessment performed on [unfilled]. [3 - Distress Level] : Distress Level: 3 [Maximal Pain Intensity: 0/10] : 0/10

## 2025-05-22 NOTE — VITALS
[Maximal Pain Intensity: 2/10] : 2/10 [Least Pain Intensity: 0/10] : 0/10 [90: Able to carry normal activity; minor signs or symptoms of disease.] : 90: Able to carry normal activity; minor signs or symptoms of disease.  [Date: ____________] : Patient's last distress assessment performed on [unfilled]. [3 - Distress Level] : Distress Level: 3 [Maximal Pain Intensity: 0/10] : 0/10 - - -

## 2025-05-30 NOTE — PHYSICAL EXAM
[Absent] : Adnexa(ae): Absent [Normal] : Anus and perineum: Normal sphincter tone, no masses, no prolapse. [Fully active, able to carry on all pre-disease performance without restriction] : Status 0 - Fully active, able to carry on all pre-disease performance without restriction [RN] : RN [FreeTextEntry2] : Chasity [de-identified] : cuff well healed

## 2025-05-30 NOTE — DISCUSSION/SUMMARY
[Reviewed Clinical Lab Test(s)] : Results of clinical tests were reviewed. [Reviewed Radiology Report(s)] : Radiology reports were reviewed. [Reviewed Radiology Film/Image(s)] : Images from radiology studies were reviewed and examined. [Discuss Alternatives/Risks/Benefits w/Patient] : All alternatives, risks, and benefits were discussed with the patient/family and all questions were answered.  Patient expressed good understanding and appreciates the importance of follow up as recommended. [Visit Time ___ Minutes] : [unfilled] minutes [Face to Face Time___ Minutes] : with [unfilled] minutes in face to face consultation. [FreeTextEntry1] : 61yo P1 w/ personal hx of ER/NC+ breast ca dx'd 10/203 s/p surgery and RT. 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 S/p adjuvant VBT. Well healed. Exam MISTY. Pt doing well, mentally still recovering. -more than 50% of visit spent face to face with patient counseling and coordinating care with high level complexity -I reviewed diagnosis, stage of disease, treatment to date. reviewed aggressive nature of uterine serous carcinoma and reviewed importance of continued surveillance. reassured pt of early stage diagnosis and adjuvant VBT means lower recurrence rate. reviewed plan for q3 month visits and imaging as needed based on exam or pt reported sxs -reviewed vaginal dryness and moisturizing, reviewed dilator use --continue f/u with radonc. medonc, breast surgeon -CT chest for follow up of lung nodules. will call with results -rtc 3 months -continue health maintenance with PCP -pain/fever/bleeding precautions given

## 2025-05-30 NOTE — DISCUSSION/SUMMARY
[Reviewed Clinical Lab Test(s)] : Results of clinical tests were reviewed. [Reviewed Radiology Report(s)] : Radiology reports were reviewed. [Reviewed Radiology Film/Image(s)] : Images from radiology studies were reviewed and examined. [Discuss Alternatives/Risks/Benefits w/Patient] : All alternatives, risks, and benefits were discussed with the patient/family and all questions were answered.  Patient expressed good understanding and appreciates the importance of follow up as recommended. [Visit Time ___ Minutes] : [unfilled] minutes [Face to Face Time___ Minutes] : with [unfilled] minutes in face to face consultation. [FreeTextEntry1] : 59yo P1 w/ personal hx of ER/UT+ breast ca dx'd 10/203 s/p surgery and RT. 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 S/p adjuvant VBT. Well healed. Exam MISTY. Pt doing well, mentally still recovering. -more than 50% of visit spent face to face with patient counseling and coordinating care with high level complexity -I reviewed diagnosis, stage of disease, treatment to date. reviewed aggressive nature of uterine serous carcinoma and reviewed importance of continued surveillance. reassured pt of early stage diagnosis and adjuvant VBT means lower recurrence rate. reviewed plan for q3 month visits and imaging as needed based on exam or pt reported sxs -reviewed vaginal dryness and moisturizing, reviewed dilator use --continue f/u with radonc. medonc, breast surgeon -CT chest for follow up of lung nodules. will call with results -rtc 3 months -continue health maintenance with PCP -pain/fever/bleeding precautions given

## 2025-05-30 NOTE — HISTORY OF PRESENT ILLNESS
[FreeTextEntry1] : 61yo P1 w/ personal hx of ER/WA+ breast ca dx'd 10/203 s/p surgery and RT. Currently on anastrazole. Pt with pelvic imaging revealing right pelvic mass and thickened EE with biopsy confirmed high grade serous carcinoma, now s/p robo tlh/bso, pelvic slnbx, omental biopsy, resection of right ovarian mass on 3/3/25.  Dx: stage 1A high grade serous carcinoma, no MI, no LVSI, MMRp Tx: robo tlh/bso, pelvic slnbx, omental biopsy, resection of right ovarian mass on 3/3/25. Adjtx: VBT completed 4/21/25 ([facility])  Since last visit, patient reports doing well. She continues to struggle with body image issues and accepting her "rapid aging". Shares that she is happy cancer was caught at early stage but she is still processing. emotional support  provided and info for wellness center and support groups given.  She denies pain/fever/bleeding/bloating. She has normal activity tolerance and normal appetite. Reports normal urination and BMs. She completed VBT last month and has not started dilators consistently . we discussed vaginal moisturizing and info sheet provided. reviewed pre-op imaging and need for f/u CT chest given  non-specific nodules noted  Path results reviewed. Pathology 3/3/25:  FINAL PATHOLOGIC DIAGNOSIS CANCER PROTOCOL Specimen Procedure: Total hysterectomy and bilateral salpingo-oophorectomy Hysterectomy Type: Laparoscopic, robotic-assisted Specimen Integrity: Intact Tumor Tumor Site: Endometrium;  Endometrial polyp Tumor Size: Several small microscopic foci Histologic Type: Serous carcinoma Myometrial Invasion: Not identified Adenomyosis: Not identified Uterine Serosa Involvement: Not identified Lower Uterine Segment Involvement: Not identified Cervical Stromal Involvement: Not identified Other Tissue / Organ Involvement: Not identified Peritoneal / Ascitic Fluid: Malignant cells not identified Lymphovascular Invasion (LVI): Not identified Regional Lymph Nodes Regional Lymph Node Status: All regional lymph nodes negative for tumor cells Lymph Nodes Examined Total Number of Pelvic Nodes Examined: 3 Number of Pelvic [place] Nodes Examined: 3 Total Number of Para-aortic Nodes Examined: 0 Pathologic Stage Classification (pTNM, AJCC 8th Edition) pT Category: pT1a Regional Lymph Nodes Modifier: (sn) pN Category: pN0 FIGO Stage FIGO Stage: IA Comment: The carcinoma is p53 and p16 positive with a high Ki-67, supporting the diagnosis . Normal mismatch repair (MMR) testing by immunohistochemistry Additional Findings Additional Findings: Endometrial polyp; Leiomyomas; Right ovarian endometrioma Best Tumor Blocks for Future Studies Tumor Block(s): D12  A. RIGHT FALLOPIAN TUBE AND OVARY MASS B.  RIGHT PELVIC SENTINEL LYMPH NODE C.  LEFT PELVIC SENTINEL LYMPH NODE D.  UTERUS, CERVIX, LEFT FALLOPIAN TUBE AND OVARY E.  PORTION OF OMENTUM  A.  RIGHT FALLOPIAN TUBE AND OVARIAN MASS: - Benign endometriotic cyst/endometrioma (16 cm) with associated reactive changes - Immunohistochemical stain results support the diagnosis Calretinin: Positive in mesothelial cells MOC31, PAX8: Positive in epithelial lining cells p53: Negative in epithelial lining cells p16: Negative for block-like staining in epithelial lining cells - Benign ovary with stromal hyperplasia - Benign fallopian tube B.  RIGHT PELVIC SENTINEL LYMPH NODE: - One lymph node, negative for carcinoma on H E and cytokeratin stains C.  LEFT PELVIC SENTINEL LYMPH NODE: - Two lymph nodes, negative for carcinoma on H E and cytokeratin stains D.  UTERUS, CERVIX, LEFT FALLOPIAN TUBE AND OVARY (101 Grams): - Serous carcinoma, high grade, small microscopic foci partially involving the surface and superficial glands of an endometrial polyp and adjacent endometrium. - Negative for myometrial invasion - Normal mismatch repair (MMR) by immunohistochemistry (Intact nuclear staining for MLH1, MSH2, MSH6 and PMS2) - Immunohistochemical stain results support the diagnosis P53: Positive P16: Positive block-like staining ER: Only weak positive Ki-67: High - Remaining endometrium is inactive with cystic changes - Uterine leiomyomas, submucosal, intramural and subserosal, the largest 1.8 cm - Benign cervix with squamous metaplasia and cysts - Bilateral parametrial tissue, negative for carcinoma - Benign ovary with stromal hyperplasia - Benign fallopian tube E.  PORTION OF OMENTUM: - Benign omental tissue  Patient's previous endometrial biopsy ([id number]; 2/14/25) High grade serous carcinoma involving endometrial polyp and seen as detached superficial fragments; Positive: p53, p16, PAX8, ER (weak), WA (weak), Ki-67 >50%  Intraoperative Diagnosis A. Right fallopian tube and ovary mass, frozen section diagnosis: - Benign hemorrhagic cyst.  Enlarged ovary with stromal hyperplasia  By Dr. Taylor to Dr. Giorgio Donovan at 12:04 PM  03/03/2025  CT c/a/p 2/25/25: CT chest impression = somewhat poorly defined 7mm and smaller subsolid nodular densities bilaterally with upper lung zone predilection are nonspecific and most likely inflammatory or post-inflammatory. less likely lung adenoca lesions. appearance is atypical for metastatic disease. bandlike left basilar opacity with somewhat nodular appearing component measuring 9x8mm is nonspecific and may be atelectasis or fibrosis. advise attention to short term follow up. likely focal thickened airway at the right base  CT a/p = impression = stable lobulated cystic structure in the pelvis measuring 12.5cm. possible paraovarian cyst or peritoneal inclusion cyst. no LAD. fat containing umbi hernia. left lower lobe nodular opacity measuring 0.8cm appears somewhat focal but is also an area of mild atelectasis. recommend f/u CT chest 3 months

## 2025-05-30 NOTE — HISTORY OF PRESENT ILLNESS
[FreeTextEntry1] : 59yo P1 w/ personal hx of ER/AK+ breast ca dx'd 10/203 s/p surgery and RT. Currently on anastrazole. Pt with pelvic imaging revealing right pelvic mass and thickened EE with biopsy confirmed high grade serous carcinoma, now s/p robo tlh/bso, pelvic slnbx, omental biopsy, resection of right ovarian mass on 3/3/25.  Dx: stage 1A high grade serous carcinoma, no MI, no LVSI, MMRp Tx: robo tlh/bso, pelvic slnbx, omental biopsy, resection of right ovarian mass on 3/3/25. Adjtx: VBT completed 4/21/25 ([facility])  Since last visit, patient reports doing well. She continues to struggle with body image issues and accepting her "rapid aging". Shares that she is happy cancer was caught at early stage but she is still processing. emotional support  provided and info for wellness center and support groups given.  She denies pain/fever/bleeding/bloating. She has normal activity tolerance and normal appetite. Reports normal urination and BMs. She completed VBT last month and has not started dilators consistently . we discussed vaginal moisturizing and info sheet provided. reviewed pre-op imaging and need for f/u CT chest given  non-specific nodules noted  Path results reviewed. Pathology 3/3/25:  FINAL PATHOLOGIC DIAGNOSIS CANCER PROTOCOL Specimen Procedure: Total hysterectomy and bilateral salpingo-oophorectomy Hysterectomy Type: Laparoscopic, robotic-assisted Specimen Integrity: Intact Tumor Tumor Site: Endometrium;  Endometrial polyp Tumor Size: Several small microscopic foci Histologic Type: Serous carcinoma Myometrial Invasion: Not identified Adenomyosis: Not identified Uterine Serosa Involvement: Not identified Lower Uterine Segment Involvement: Not identified Cervical Stromal Involvement: Not identified Other Tissue / Organ Involvement: Not identified Peritoneal / Ascitic Fluid: Malignant cells not identified Lymphovascular Invasion (LVI): Not identified Regional Lymph Nodes Regional Lymph Node Status: All regional lymph nodes negative for tumor cells Lymph Nodes Examined Total Number of Pelvic Nodes Examined: 3 Number of Pelvic [place] Nodes Examined: 3 Total Number of Para-aortic Nodes Examined: 0 Pathologic Stage Classification (pTNM, AJCC 8th Edition) pT Category: pT1a Regional Lymph Nodes Modifier: (sn) pN Category: pN0 FIGO Stage FIGO Stage: IA Comment: The carcinoma is p53 and p16 positive with a high Ki-67, supporting the diagnosis . Normal mismatch repair (MMR) testing by immunohistochemistry Additional Findings Additional Findings: Endometrial polyp; Leiomyomas; Right ovarian endometrioma Best Tumor Blocks for Future Studies Tumor Block(s): D12  A. RIGHT FALLOPIAN TUBE AND OVARY MASS B.  RIGHT PELVIC SENTINEL LYMPH NODE C.  LEFT PELVIC SENTINEL LYMPH NODE D.  UTERUS, CERVIX, LEFT FALLOPIAN TUBE AND OVARY E.  PORTION OF OMENTUM  A.  RIGHT FALLOPIAN TUBE AND OVARIAN MASS: - Benign endometriotic cyst/endometrioma (16 cm) with associated reactive changes - Immunohistochemical stain results support the diagnosis Calretinin: Positive in mesothelial cells MOC31, PAX8: Positive in epithelial lining cells p53: Negative in epithelial lining cells p16: Negative for block-like staining in epithelial lining cells - Benign ovary with stromal hyperplasia - Benign fallopian tube B.  RIGHT PELVIC SENTINEL LYMPH NODE: - One lymph node, negative for carcinoma on H E and cytokeratin stains C.  LEFT PELVIC SENTINEL LYMPH NODE: - Two lymph nodes, negative for carcinoma on H E and cytokeratin stains D.  UTERUS, CERVIX, LEFT FALLOPIAN TUBE AND OVARY (101 Grams): - Serous carcinoma, high grade, small microscopic foci partially involving the surface and superficial glands of an endometrial polyp and adjacent endometrium. - Negative for myometrial invasion - Normal mismatch repair (MMR) by immunohistochemistry (Intact nuclear staining for MLH1, MSH2, MSH6 and PMS2) - Immunohistochemical stain results support the diagnosis P53: Positive P16: Positive block-like staining ER: Only weak positive Ki-67: High - Remaining endometrium is inactive with cystic changes - Uterine leiomyomas, submucosal, intramural and subserosal, the largest 1.8 cm - Benign cervix with squamous metaplasia and cysts - Bilateral parametrial tissue, negative for carcinoma - Benign ovary with stromal hyperplasia - Benign fallopian tube E.  PORTION OF OMENTUM: - Benign omental tissue  Patient's previous endometrial biopsy ([id number]; 2/14/25) High grade serous carcinoma involving endometrial polyp and seen as detached superficial fragments; Positive: p53, p16, PAX8, ER (weak), AK (weak), Ki-67 >50%  Intraoperative Diagnosis A. Right fallopian tube and ovary mass, frozen section diagnosis: - Benign hemorrhagic cyst.  Enlarged ovary with stromal hyperplasia  By Dr. Taylor to Dr. Giorgio Donovan at 12:04 PM  03/03/2025  CT c/a/p 2/25/25: CT chest impression = somewhat poorly defined 7mm and smaller subsolid nodular densities bilaterally with upper lung zone predilection are nonspecific and most likely inflammatory or post-inflammatory. less likely lung adenoca lesions. appearance is atypical for metastatic disease. bandlike left basilar opacity with somewhat nodular appearing component measuring 9x8mm is nonspecific and may be atelectasis or fibrosis. advise attention to short term follow up. likely focal thickened airway at the right base  CT a/p = impression = stable lobulated cystic structure in the pelvis measuring 12.5cm. possible paraovarian cyst or peritoneal inclusion cyst. no LAD. fat containing umbi hernia. left lower lobe nodular opacity measuring 0.8cm appears somewhat focal but is also an area of mild atelectasis. recommend f/u CT chest 3 months

## 2025-05-30 NOTE — PHYSICAL EXAM
[Absent] : Adnexa(ae): Absent [Normal] : Anus and perineum: Normal sphincter tone, no masses, no prolapse. [Fully active, able to carry on all pre-disease performance without restriction] : Status 0 - Fully active, able to carry on all pre-disease performance without restriction [RN] : RN [FreeTextEntry2] : Chasity [de-identified] : cuff well healed

## 2025-05-31 NOTE — HISTORY OF PRESENT ILLNESS
[FreeTextEntry1] : Mrs. Grullon is a 60-year-old female, she was diagnosed with Stage I ER/MI+ invasive ductal carcinoma of the right breast status post right breast partial mastectomy with sentinel lymph node biopsy and tissue rearrangement reduction mastopexy, status post adjuvant radiation therapy to the right breast.in 10/2024. She is now presenting with a newly diagnosed serous endometrial carcinoma. She is present today for follow-up s/p completion of brachytherapy.   Pelvic imaging revealed a right pelvic mass, fibroid uterus, thickened EE, and labs with elevated ca125 and cea. Endometrial biopsy was performed 9/27/24 and revealed strips of atrophic endometrial epithelium.  CT c/a/p 10/4/25: revealed a 15.1cm lobulated low density mass in the anterior pelvis, abutting and inseparable from the right ovary.   10/21/2024 MRI Pelvis - Lobulated T1 hyperintense structure measuring up to 13.8 cm extending from the right lower quadrant into the pelvis with differential considerations including right proteinaceous paraovarian cyst versus peritoneal inclusion cyst - Uterine fibroids measuring up to 2.1 cm including a subcentimeter submucosal/intracavitary fibroid - 0.6 cm heterogeneous structure within the fundal endometrial canal possibly representing additional submucosal fibroid versus polyp    Ms. Grullon was evaluated by Dr. Faulkner and endometrial biopsy was performed on 2/14/25 that revealed: FINAL PATHOLOGIC DIAGNOSIS ENDOMETRIAL BIOPSY: 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  On 2/25/25 CT chest was performed and revealed a somewhat poorly defined 7mm and smaller subsolid nodular densities bilaterally with upper lung zone predilection nonspecific and most likely inflammatory or post-inflammatory. There was a bandlike left basilar opacity with somewhat nodular appearing component measuring 9x8mm is nonspecific and may be atelectasis or fibrosis.   2/25/25 - CT A/P revealed a stable lobulated cystic structure in the pelvis measuring 12.5cm. Possible paraovarian cyst or peritoneal inclusion cyst. No lymphadenopathy. The was fat containing umbilical hernia and a left lower lobe nodular opacity measuring 0.8cm that appeared somewhat focal, and an area of mild atelectasis.   On 3/3/25, Dr. Giorgio Donovan performed a robotic tlh/bso/slnbx/omx/pelvic mass resection/possible laparotomy Final path demonstrated a stage 1A high grade serous carcinoma. pelvic washings were negative. The disease was confined to a polyp, there was no myometrial invasion, no LVI, SLNs negative, MMRp. All regional lymph nodes were positive for tumor cells (0/3). FIGO Stage IA pT1a pN0. The carcinoma was p53 and p16 positive with a high Ki-67.   Ms. Grullon was treated with and completed vaginal brachytherapy on 4/21/25 toa dose of 21Gy. She reports she is doing well. She reports mild dysuria/tingling with urination post treatment that has resolved. She denies increase in urinary frequency or urgency, She denies any loose stools but does have constipation status post initial surgery. She is using her dilator on occasion - took a break while sick with common cold. She is otherwise doing well.

## 2025-05-31 NOTE — PHYSICAL EXAM
[Normal Vaginal Cuff] : vaginal cuff without lesion or nodularity [Normal] : no joint swelling, no clubbing or cyanosis of the fingernails and muscle strength and tone were normal [de-identified] : vaginal cuff appears well healed

## 2025-05-31 NOTE — HISTORY OF PRESENT ILLNESS
[FreeTextEntry1] : Mrs. Grullon is a 60-year-old female, she was diagnosed with Stage I ER/OH+ invasive ductal carcinoma of the right breast status post right breast partial mastectomy with sentinel lymph node biopsy and tissue rearrangement reduction mastopexy, status post adjuvant radiation therapy to the right breast.in 10/2024. She is now presenting with a newly diagnosed serous endometrial carcinoma. She is present today for follow-up s/p completion of brachytherapy.   Pelvic imaging revealed a right pelvic mass, fibroid uterus, thickened EE, and labs with elevated ca125 and cea. Endometrial biopsy was performed 9/27/24 and revealed strips of atrophic endometrial epithelium.  CT c/a/p 10/4/25: revealed a 15.1cm lobulated low density mass in the anterior pelvis, abutting and inseparable from the right ovary.   10/21/2024 MRI Pelvis - Lobulated T1 hyperintense structure measuring up to 13.8 cm extending from the right lower quadrant into the pelvis with differential considerations including right proteinaceous paraovarian cyst versus peritoneal inclusion cyst - Uterine fibroids measuring up to 2.1 cm including a subcentimeter submucosal/intracavitary fibroid - 0.6 cm heterogeneous structure within the fundal endometrial canal possibly representing additional submucosal fibroid versus polyp    Ms. Grullon was evaluated by Dr. Faulkner and endometrial biopsy was performed on 2/14/25 that revealed: FINAL PATHOLOGIC DIAGNOSIS ENDOMETRIAL BIOPSY: 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  On 2/25/25 CT chest was performed and revealed a somewhat poorly defined 7mm and smaller subsolid nodular densities bilaterally with upper lung zone predilection nonspecific and most likely inflammatory or post-inflammatory. There was a bandlike left basilar opacity with somewhat nodular appearing component measuring 9x8mm is nonspecific and may be atelectasis or fibrosis.   2/25/25 - CT A/P revealed a stable lobulated cystic structure in the pelvis measuring 12.5cm. Possible paraovarian cyst or peritoneal inclusion cyst. No lymphadenopathy. The was fat containing umbilical hernia and a left lower lobe nodular opacity measuring 0.8cm that appeared somewhat focal, and an area of mild atelectasis.   On 3/3/25, Dr. Giorgio Donovan performed a robotic tlh/bso/slnbx/omx/pelvic mass resection/possible laparotomy Final path demonstrated a stage 1A high grade serous carcinoma. pelvic washings were negative. The disease was confined to a polyp, there was no myometrial invasion, no LVI, SLNs negative, MMRp. All regional lymph nodes were positive for tumor cells (0/3). FIGO Stage IA pT1a pN0. The carcinoma was p53 and p16 positive with a high Ki-67.   Ms. Grullon was treated with and completed vaginal brachytherapy on 4/21/25 toa dose of 21Gy. She reports she is doing well. She reports mild dysuria/tingling with urination post treatment that has resolved. She denies increase in urinary frequency or urgency, She denies any loose stools but does have constipation status post initial surgery. She is using her dilator on occasion - took a break while sick with common cold. She is otherwise doing well.

## 2025-05-31 NOTE — HISTORY OF PRESENT ILLNESS
[FreeTextEntry1] : Mrs. Grullon is a 60-year-old female, she was diagnosed with Stage I ER/MD+ invasive ductal carcinoma of the right breast status post right breast partial mastectomy with sentinel lymph node biopsy and tissue rearrangement reduction mastopexy, status post adjuvant radiation therapy to the right breast.in 10/2024. She is now presenting with a newly diagnosed serous endometrial carcinoma. She is present today for follow-up s/p completion of brachytherapy.   Pelvic imaging revealed a right pelvic mass, fibroid uterus, thickened EE, and labs with elevated ca125 and cea. Endometrial biopsy was performed 9/27/24 and revealed strips of atrophic endometrial epithelium.  CT c/a/p 10/4/25: revealed a 15.1cm lobulated low density mass in the anterior pelvis, abutting and inseparable from the right ovary.   10/21/2024 MRI Pelvis - Lobulated T1 hyperintense structure measuring up to 13.8 cm extending from the right lower quadrant into the pelvis with differential considerations including right proteinaceous paraovarian cyst versus peritoneal inclusion cyst - Uterine fibroids measuring up to 2.1 cm including a subcentimeter submucosal/intracavitary fibroid - 0.6 cm heterogeneous structure within the fundal endometrial canal possibly representing additional submucosal fibroid versus polyp    Ms. Grullon was evaluated by Dr. Faulkner and endometrial biopsy was performed on 2/14/25 that revealed: FINAL PATHOLOGIC DIAGNOSIS ENDOMETRIAL BIOPSY: 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  On 2/25/25 CT chest was performed and revealed a somewhat poorly defined 7mm and smaller subsolid nodular densities bilaterally with upper lung zone predilection nonspecific and most likely inflammatory or post-inflammatory. There was a bandlike left basilar opacity with somewhat nodular appearing component measuring 9x8mm is nonspecific and may be atelectasis or fibrosis.   2/25/25 - CT A/P revealed a stable lobulated cystic structure in the pelvis measuring 12.5cm. Possible paraovarian cyst or peritoneal inclusion cyst. No lymphadenopathy. The was fat containing umbilical hernia and a left lower lobe nodular opacity measuring 0.8cm that appeared somewhat focal, and an area of mild atelectasis.   On 3/3/25, Dr. Giorgio Donovan performed a robotic tlh/bso/slnbx/omx/pelvic mass resection/possible laparotomy Final path demonstrated a stage 1A high grade serous carcinoma. pelvic washings were negative. The disease was confined to a polyp, there was no myometrial invasion, no LVI, SLNs negative, MMRp. All regional lymph nodes were positive for tumor cells (0/3). FIGO Stage IA pT1a pN0. The carcinoma was p53 and p16 positive with a high Ki-67.   Ms. Grullon was treated with and completed vaginal brachytherapy on 4/21/25 toa dose of 21Gy. She reports she is doing well. She reports mild dysuria/tingling with urination post treatment that has resolved. She denies increase in urinary frequency or urgency, She denies any loose stools but does have constipation status post initial surgery. She is using her dilator on occasion - took a break while sick with common cold. She is otherwise doing well.

## 2025-05-31 NOTE — PHYSICAL EXAM
[Normal Vaginal Cuff] : vaginal cuff without lesion or nodularity [Normal] : no joint swelling, no clubbing or cyanosis of the fingernails and muscle strength and tone were normal [de-identified] : vaginal cuff appears well healed

## 2025-05-31 NOTE — PHYSICAL EXAM
[Normal Vaginal Cuff] : vaginal cuff without lesion or nodularity [Normal] : no joint swelling, no clubbing or cyanosis of the fingernails and muscle strength and tone were normal [de-identified] : vaginal cuff appears well healed